# Patient Record
Sex: MALE | Race: WHITE | NOT HISPANIC OR LATINO | Employment: FULL TIME | URBAN - METROPOLITAN AREA
[De-identification: names, ages, dates, MRNs, and addresses within clinical notes are randomized per-mention and may not be internally consistent; named-entity substitution may affect disease eponyms.]

---

## 2017-08-01 ENCOUNTER — HOSPITAL ENCOUNTER (OUTPATIENT)
Dept: RADIOLOGY | Facility: HOSPITAL | Age: 32
Discharge: HOME | End: 2017-08-01
Attending: ORTHOPAEDIC SURGERY
Payer: COMMERCIAL

## 2017-08-01 DIAGNOSIS — M75.82 OTHER SHOULDER LESIONS, LEFT SHOULDER: ICD-10-CM

## 2017-08-01 PROCEDURE — A9585 GADOBUTROL INJECTION: HCPCS | Performed by: ORTHOPAEDIC SURGERY

## 2017-08-01 PROCEDURE — 23350 INJECTION FOR SHOULDER X-RAY: CPT | Mod: ,,, | Performed by: RADIOLOGY

## 2017-08-01 PROCEDURE — 25500020 PHARM REV CODE 255: Performed by: ORTHOPAEDIC SURGERY

## 2017-08-01 PROCEDURE — 23350 INJECTION FOR SHOULDER X-RAY: CPT | Mod: TC

## 2017-08-01 PROCEDURE — 73222 MRI JOINT UPR EXTREM W/DYE: CPT | Mod: 26,LT,, | Performed by: RADIOLOGY

## 2017-08-01 PROCEDURE — 73040 CONTRAST X-RAY OF SHOULDER: CPT | Mod: 26,,, | Performed by: RADIOLOGY

## 2017-08-01 PROCEDURE — 73222 MRI JOINT UPR EXTREM W/DYE: CPT | Mod: TC,LT

## 2017-08-01 RX ORDER — GADOBUTROL 604.72 MG/ML
1 INJECTION INTRAVENOUS
Status: COMPLETED | OUTPATIENT
Start: 2017-08-01 | End: 2017-08-01

## 2017-08-01 RX ADMIN — GADOBUTROL 1 ML: 604.72 INJECTION INTRAVENOUS at 11:08

## 2017-08-01 RX ADMIN — IOHEXOL 10 ML: 300 INJECTION, SOLUTION INTRAVENOUS at 11:08

## 2019-11-26 ENCOUNTER — HOSPITAL ENCOUNTER (OUTPATIENT)
Dept: RADIOLOGY | Facility: HOSPITAL | Age: 34
Discharge: HOME | End: 2019-11-26
Attending: PHYSICIAN ASSISTANT
Payer: COMMERCIAL

## 2019-11-26 DIAGNOSIS — M54.16 RADICULOPATHY OF LUMBAR REGION: ICD-10-CM

## 2019-11-26 PROCEDURE — 72148 MRI LUMBAR SPINE W/O DYE: CPT | Mod: TC

## 2019-11-26 PROCEDURE — 72148 MRI LUMBAR SPINE W/O DYE: CPT | Mod: 26,,, | Performed by: RADIOLOGY

## 2019-11-26 PROCEDURE — 72148 MRI LUMBAR SPINE WITHOUT CONTRAST: ICD-10-PCS | Mod: 26,,, | Performed by: RADIOLOGY

## 2019-12-04 ENCOUNTER — TELEPHONE (OUTPATIENT)
Dept: NEUROSURGERY | Facility: CLINIC | Age: 34
End: 2019-12-04

## 2019-12-04 NOTE — TELEPHONE ENCOUNTER
----- Message from Alvarez Simmons sent at 12/4/2019  2:07 PM CST -----  Contact: [address] / [phone]   Type:  Sooner Apoointment Request    Caller is requesting a sooner appointment.  Caller declined first available appointment listed below.  Caller will not accept being placed on the waitlist and is requesting a message be sent to doctor.  Name of Caller: Rayna   When is the first available appointment? 1/08/20  Symptoms: second opinion for back issues   Would the patient rather a call back or a response via [facility]?  Call back   Best Call Back Number: [phone]  Additional Information:

## 2019-12-11 NOTE — PROGRESS NOTES
History & Physical    SUBJECTIVE:     Chief Complaint:  Back pain and left leg pain    History of Present Illness:  Eliceo Rubio is 34 y.o.  male with history of hypothyroidism and seasonal allergies who presents for neurosurgical evaluation, referred by his PCP.  Patient is here with wife.  He reports of chronic intermittent low back pain for over 8 years.  Pain progressively worsened and may and he developed left leg radiating pain.  He went to physical therapy and chiropractor which only helped with his flexibility but now his pain.  Patient is complaining constant aching/burning/throbbing back pain rated 5-6/10 that radiates down left leg anteriorly to below the knee.  Leg pain is described as shocking/burning sensation.  He also occasionally hears popping in his back.  Back pain > leg pain.  He takes Percocet p.r.n. severe pain; he takes gabapentin 300 mg q.h.s. but makes him very groggy the next day.  Tried Flexeril and Zanaflex in the past with no relief.  Pain worsens with any back ROM, prolonged standing, or weight bear on his left leg.  Wife feels like he is progressively worsen as he is not able to do ADLs or care further children.  Denies any leg weakness, bladder/bowel incontinence, or saddle anesthesia.  Denies any previous epidural injections.          Low Back Pain Scale  R Low Back-Pain Score: 5  R Low Back-Pain Intensity: I can tolerate the pain I have without having to use pain killers  R Low Back-Pain Score: I can look after myself normally without causing extra pain  Low Back-Lifting: I can lift heavy weights but it gives extra pain   Low Back-Walking: Pain prevents me walking more than .5 mile   Low Back-Sitting: Pain prevents me from sitting more than 1 hour   Low Back-Standing: I cannot stand for longer than 10 minutes with increasing pain   Low Back-Sleeping: Because of pain my normal nights sleep is reduced by less than one half   Low Back-Social Life: Pain has no significant effect  on my social life apart from limiting my more en   Low Back-Traveling: I have extra pain while traveling but it does not compel me to seek alternate forms of travel   Low Back-Changing Degree of Pain: My pain is gradually worsening           Review of patient's allergies indicates:  No Known Allergies    No current outpatient medications on file.     No current facility-administered medications for this visit.        No past medical history on file.  No past surgical history on file.  Family History     None        Social History     Socioeconomic History    Marital status: Single     Spouse name: Not on file    Number of children: Not on file    Years of education: Not on file    Highest education level: Not on file   Occupational History    Not on file   Social Needs    Financial resource strain: Not on file    Food insecurity:     Worry: Not on file     Inability: Not on file    Transportation needs:     Medical: Not on file     Non-medical: Not on file   Tobacco Use    Smoking status: Not on file   Substance and Sexual Activity    Alcohol use: Not on file    Drug use: Not on file    Sexual activity: Not on file   Lifestyle    Physical activity:     Days per week: Not on file     Minutes per session: Not on file    Stress: Not on file   Relationships    Social connections:     Talks on phone: Not on file     Gets together: Not on file     Attends Evangelical service: Not on file     Active member of club or organization: Not on file     Attends meetings of clubs or organizations: Not on file     Relationship status: Not on file   Other Topics Concern    Not on file   Social History Narrative    Not on file       Review of Systems:  Review of Systems    Constitutional: no fever, chills or night sweats. No changes in weight   Eyes: no visual changes   ENT: no nasal congestion or sore throat   Respiratory: no cough or shortness of breath   Cardiovascular: no chest pain or palpitations  "  Gastrointestinal: no nausea or vomiting   Genitourinary: no hematuria or dysuria   Integument/Breast: no rash or pruritis   Hematologic/Lymphatic: no easy bruising or lymphadenopathy   Musculoskeletal: no arthralgias or myalgias.  Positive for low back pain radiating to left leg pain  Neurological: no seizures or tremors. No weakness or paresthesia.   Behavioral/Psych: no auditory or visual hallucinations   Endocrine: no heat or cold intolerance       OBJECTIVE:     Vital Signs  Temp: 97.8 °F (36.6 °C)  Pulse: (!) 56  BP: (!) 143/94  Pain Score:   6  Height: 5' 11" (180.3 cm)  Weight: 85 kg (187 lb 6.3 oz)  Body mass index is 26.14 kg/m².      Physical Exam:  Neurosurgery Physical Exam    General: well developed, well nourished, no distress.   Neurologic: Awake, alert and oriented x3. Thought content appropriate.  GCS: Motor: 6/Verbal: 5/Eyes: 4 GCS Total: 15  Cranial nerves: II-XII grossly intact. PERRLA. EOMI without nystagmus. Face symmetric and sensation intact to light touch, tongue midline, shoulder shrug symmetric bilaterally.  Hearing equal bilaterally to finger rub. Palate and uvula rise and fall normally in midline.    Language: no aphasia  Speech: no dysarthria   Neck: supple, without obvious masses    Sensory: intact to light touch B/L UE and LE  Motor Strength: Moves all extremities spontaneously with good tone. Full strength upper and lower extremities. No abnormal movements seen.    Strength  Deltoids Triceps Biceps Wrist Extension Wrist Flexion Hand    Upper: R 5/5 5/5 5/5 5/5 5/5 5/5    L 5/5 5/5 5/5 5/5 5/5 5/5     Iliopsoas Quadriceps Knee  Flexion Tibialis  anterior Gastro- cnemius EHL   Lower: R 5/5 5/5 5/5 5/5 5/5 5/5    L 5/5 5/5 5/5 5/5 5/5 5/5     Interossi muscle strength- intact    DTR's - 2 + and symmetric in UE and LE  Sy's - positive on left  Lhermitte's - Negative  Spurlings-   Negative         Ankle Clonus - Negative           Babinski  - Negative     SLR - Negative; " reproduces pain to low back   Gait - antalgic Tandem Gait - No difficulty    Able to walk/stand on heels & toes  Cervical ROM - full  Lumbar ROM - limited; pain with all ROM  No focal numbness or weakness  No midline or paraspinal tenderness to palpation  No difficulty transitioning from seated to standing position or vice versa.  ENT: normal hearing with finger rub  Heart: RRR, no cyanosis, pallor, or edema.   Lungs- normal respiratory effort  Abdomen-  soft, symmetric and nontender  Skin: grossly intact in all 4 extremities without obvious rashes or lesions  Extremities: warm with no cyanosis or edema, or clubbing  Pulses: palpable distal pulses    SI Joint tenderness - Negative  Greater Trochanter Joint tenderness - Negative  Prieto's Test - Negative   Pain on Hip ROM - Negative      Posture-  No obvious kyphosis with standing posture.  With bending posture, no obvious scapula wing        Diagnostic Results:  All imaging personally reviewed    MRI lumbar spine 11/26/2019  Straightening of lumbar lordosis.  L4-5 disc degeneration bulge with facet arthropathy causing mild left greater than right neural foraminal stenosis.  Severe degenerative disc disease at L5-S1 with mild disc herniation and moderate left neural foraminal stenosis.      ASSESSMENT/PLAN:     34-year-old male with history of asthma hypothyroidism and chronic back pain for over 8 years that has progressively worsened since May 2019 with radiation down to his left leg.    At this time, will start conservative treatment.  -continue home exercise therapy program.  Patient was given lumbar PT exercises.  -Robaxin t.i.d. p.r.n. muscle spasms.  -continue gabapentin and increased to 300 b.i.d. as tolerated.  -recommended alternating heat/ice with OTC TENS unit for myofascial pain  -refer patient to pain management for left L5-S1 transforaminal epidural steroid injection.  -patient will follow up me in 6 weeks pending completion of the above.        All  imaging were reviewed with patient and wife. All questions were answered.  Patient verbalized understanding and agreed with the above plan of care.  Patient was encouraged to call clinic with any future concerns prior to follow up appt.    Please call with any questions.      [facility]      Note dictated with voice recognition software, please excuse any grammatical errors.

## 2019-12-13 ENCOUNTER — TELEPHONE (OUTPATIENT)
Dept: PAIN MEDICINE | Facility: CLINIC | Age: 34
End: 2019-12-13

## 2019-12-13 ENCOUNTER — OFFICE VISIT (OUTPATIENT)
Dept: NEUROSURGERY | Facility: CLINIC | Age: 34
End: 2019-12-13
Payer: COMMERCIAL

## 2019-12-13 VITALS
TEMPERATURE: 98 F | HEIGHT: 71 IN | WEIGHT: 187.38 LBS | BODY MASS INDEX: 26.23 KG/M2 | HEART RATE: 56 BPM | SYSTOLIC BLOOD PRESSURE: 143 MMHG | DIASTOLIC BLOOD PRESSURE: 94 MMHG

## 2019-12-13 DIAGNOSIS — M54.16 LUMBAR RADICULOPATHY: Primary | ICD-10-CM

## 2019-12-13 DIAGNOSIS — M62.830 MUSCLE SPASM OF BACK: ICD-10-CM

## 2019-12-13 DIAGNOSIS — M54.16 LUMBAR RADICULOPATHY, ACUTE: Primary | ICD-10-CM

## 2019-12-13 DIAGNOSIS — M47.816 LUMBAR SPONDYLOSIS: ICD-10-CM

## 2019-12-13 DIAGNOSIS — M51.36 DEGENERATIVE DISC DISEASE, LUMBAR: ICD-10-CM

## 2019-12-13 PROCEDURE — 99999 PR PBB SHADOW E&M-EST. PATIENT-LVL IV: ICD-10-PCS | Mod: PBBFAC,,, | Performed by: PHYSICIAN ASSISTANT

## 2019-12-13 PROCEDURE — 99204 OFFICE O/P NEW MOD 45 MIN: CPT | Mod: S$GLB,,, | Performed by: PHYSICIAN ASSISTANT

## 2019-12-13 PROCEDURE — 99204 PR OFFICE/OUTPT VISIT, NEW, LEVL IV, 45-59 MIN: ICD-10-PCS | Mod: S$GLB,,, | Performed by: PHYSICIAN ASSISTANT

## 2019-12-13 PROCEDURE — 3008F PR BODY MASS INDEX (BMI) DOCUMENTED: ICD-10-PCS | Mod: CPTII,S$GLB,, | Performed by: PHYSICIAN ASSISTANT

## 2019-12-13 PROCEDURE — 3008F BODY MASS INDEX DOCD: CPT | Mod: CPTII,S$GLB,, | Performed by: PHYSICIAN ASSISTANT

## 2019-12-13 PROCEDURE — 99999 PR PBB SHADOW E&M-EST. PATIENT-LVL IV: CPT | Mod: PBBFAC,,, | Performed by: PHYSICIAN ASSISTANT

## 2019-12-13 RX ORDER — FLUTICASONE PROPIONATE 50 MCG
SPRAY, SUSPENSION (ML) NASAL
Refills: 2 | COMMUNITY
Start: 2019-11-29

## 2019-12-13 RX ORDER — GABAPENTIN 300 MG/1
CAPSULE ORAL
Refills: 0 | COMMUNITY
Start: 2019-10-15

## 2019-12-13 RX ORDER — LEVOTHYROXINE SODIUM 25 UG/1
50 TABLET ORAL DAILY
Refills: 1 | COMMUNITY
Start: 2019-10-16

## 2019-12-13 RX ORDER — METHOCARBAMOL 750 MG/1
750 TABLET, FILM COATED ORAL 3 TIMES DAILY PRN
Qty: 60 TABLET | Refills: 1 | Status: SHIPPED | OUTPATIENT
Start: 2019-12-13 | End: 2019-12-23

## 2019-12-13 RX ORDER — CETIRIZINE HYDROCHLORIDE 10 MG/1
10 TABLET ORAL DAILY
COMMUNITY

## 2019-12-13 RX ORDER — MELOXICAM 7.5 MG/1
7.5 TABLET ORAL DAILY
Qty: 14 TABLET | Refills: 1 | Status: SHIPPED | OUTPATIENT
Start: 2019-12-13 | End: 2019-12-27

## 2019-12-13 NOTE — LETTER
December 13, 2019      Jn Mendes PA-C  [address]  [place]  [place] [address]           [place] [facility]  [address]  [place] [address]  Phone: [phone]          Patient: Eliceo Rubio   MR Number: [id number]   YOB: 1985   Date of Visit: 12/13/2019       Dear Jn Mendes:    Thank you for referring Eliceo Rubio to me for evaluation. Attached you will find relevant portions of my assessment and plan of care.    If you have questions, please do not hesitate to call me. I look forward to following Eliceo Rubio along with you.    Sincerely,    Aneudy Pedraza PA-C    Enclosure  CC:  No Recipients    If you would like to receive this communication electronically, please contact [email or web address] or [phone] to request more information on [employer] Link access.    For providers and/or their staff who would like to refer a patient to [facility], please contact us through our one-stop-shop provider referral line, [facility] , at [phone].    If you feel you have received this communication in error or would no longer like to receive these types of communications, please e-mail [email or web address]

## 2019-12-13 NOTE — TELEPHONE ENCOUNTER
Pt scheduled for 12/31/19 at 2:45pm for Left TFESI. Pt aware to check in at registration desk on the first floor of the hospital for 1:45 pm. Pt denied taking blood thinners and is not diabetic.

## 2019-12-31 ENCOUNTER — HOSPITAL ENCOUNTER (OUTPATIENT)
Facility: HOSPITAL | Age: 34
Discharge: HOME | End: 2019-12-31
Attending: PAIN MEDICINE | Admitting: PAIN MEDICINE
Payer: COMMERCIAL

## 2019-12-31 VITALS
HEART RATE: 57 BPM | OXYGEN SATURATION: 98 % | TEMPERATURE: 98 F | SYSTOLIC BLOOD PRESSURE: 127 MMHG | WEIGHT: 185 LBS | DIASTOLIC BLOOD PRESSURE: 77 MMHG | HEIGHT: 71 IN | BODY MASS INDEX: 25.9 KG/M2 | RESPIRATION RATE: 16 BRPM

## 2019-12-31 DIAGNOSIS — G89.29 CHRONIC PAIN: ICD-10-CM

## 2019-12-31 DIAGNOSIS — M54.16 LUMBAR RADICULOPATHY: Primary | ICD-10-CM

## 2019-12-31 PROCEDURE — 25000003 PHARM REV CODE 250: Performed by: PAIN MEDICINE

## 2019-12-31 PROCEDURE — 25500020 PHARM REV CODE 255: Performed by: PAIN MEDICINE

## 2019-12-31 PROCEDURE — 99152 MOD SED SAME PHYS/QHP 5/>YRS: CPT | Performed by: PAIN MEDICINE

## 2019-12-31 PROCEDURE — 64483 NJX AA&/STRD TFRM EPI L/S 1: CPT | Performed by: PAIN MEDICINE

## 2019-12-31 PROCEDURE — 64484 NJX AA&/STRD TFRM EPI L/S EA: CPT | Mod: LT,,, | Performed by: PAIN MEDICINE

## 2019-12-31 PROCEDURE — 64484 PRA INJECT ANES/STEROID FORAMEN LUMBAR/SACRAL W IMG GUIDE ,EA ADD LEVEL: ICD-10-PCS | Mod: LT,,, | Performed by: PAIN MEDICINE

## 2019-12-31 PROCEDURE — 64484 NJX AA&/STRD TFRM EPI L/S EA: CPT | Performed by: PAIN MEDICINE

## 2019-12-31 PROCEDURE — 64483 NJX AA&/STRD TFRM EPI L/S 1: CPT | Mod: LT,,, | Performed by: PAIN MEDICINE

## 2019-12-31 PROCEDURE — 64483 PR EPIDURAL INJ, ANES/STEROID, TRANSFORAMINAL, LUMB/SACR, SNGL LEVL: ICD-10-PCS | Mod: LT,,, | Performed by: PAIN MEDICINE

## 2019-12-31 PROCEDURE — 99153 MOD SED SAME PHYS/QHP EA: CPT | Performed by: PAIN MEDICINE

## 2019-12-31 PROCEDURE — 63600175 PHARM REV CODE 636 W HCPCS: Performed by: PAIN MEDICINE

## 2019-12-31 RX ORDER — SODIUM CHLORIDE 9 MG/ML
500 INJECTION, SOLUTION INTRAVENOUS CONTINUOUS
Status: ACTIVE | OUTPATIENT
Start: 2019-12-31 | End: 2020-01-01

## 2019-12-31 RX ORDER — DEXAMETHASONE SODIUM PHOSPHATE 10 MG/ML
INJECTION INTRAMUSCULAR; INTRAVENOUS
Status: DISCONTINUED | OUTPATIENT
Start: 2019-12-31 | End: 2019-12-31 | Stop reason: HOSPADM

## 2019-12-31 RX ORDER — LIDOCAINE HYDROCHLORIDE 10 MG/ML
INJECTION, SOLUTION EPIDURAL; INFILTRATION; INTRACAUDAL; PERINEURAL
Status: DISCONTINUED | OUTPATIENT
Start: 2019-12-31 | End: 2019-12-31 | Stop reason: HOSPADM

## 2019-12-31 RX ORDER — INDOMETHACIN 25 MG/1
CAPSULE ORAL
Status: DISCONTINUED | OUTPATIENT
Start: 2019-12-31 | End: 2019-12-31 | Stop reason: HOSPADM

## 2019-12-31 RX ORDER — FENTANYL CITRATE 50 UG/ML
INJECTION, SOLUTION INTRAMUSCULAR; INTRAVENOUS
Status: DISCONTINUED | OUTPATIENT
Start: 2019-12-31 | End: 2019-12-31 | Stop reason: HOSPADM

## 2019-12-31 RX ORDER — MIDAZOLAM HYDROCHLORIDE 1 MG/ML
INJECTION, SOLUTION INTRAMUSCULAR; INTRAVENOUS
Status: DISCONTINUED | OUTPATIENT
Start: 2019-12-31 | End: 2019-12-31 | Stop reason: HOSPADM

## 2019-12-31 RX ORDER — BUPIVACAINE HYDROCHLORIDE 2.5 MG/ML
INJECTION, SOLUTION EPIDURAL; INFILTRATION; INTRACAUDAL
Status: DISCONTINUED | OUTPATIENT
Start: 2019-12-31 | End: 2019-12-31 | Stop reason: HOSPADM

## 2019-12-31 RX ORDER — LIDOCAINE HYDROCHLORIDE 10 MG/ML
1 INJECTION, SOLUTION EPIDURAL; INFILTRATION; INTRACAUDAL; PERINEURAL ONCE
Status: ACTIVE | OUTPATIENT
Start: 2019-12-31

## 2019-12-31 RX ADMIN — SODIUM CHLORIDE 500 ML: 0.9 INJECTION, SOLUTION INTRAVENOUS at 11:12

## 2019-12-31 NOTE — OP NOTE
"Procedure Note    Pre-operative Diagnosis: Lumbar Radiculopathy  Post-operative Diagnosis: Lumbar Radiculopathy  Procedure Date: 12/31/2019  Procedure:  (1) Lumbar Transforaminal Epidural Steroid Injection, Two Levels, LEFT L4-5 and L5-S1    (2) Intraoperative fluoroscopy          Indications: To alleviate pain and suffering, and reduce functional impairment associated with Lumbar radiculopathy.      The patients history and physical exam were reviewed. The risks, benefits and alternatives to the procedure were discussed, and all questions were answered to the patients satisfaction. The patient agreed to proceed, and written informed consent was verified.    Procedure in Detail: Using a fenestrated drape and Chloro-prep, the skin was prepared and draped in sterile fashion. The Left L4-5 and L5-S1 neural foramena were identified by fluoroscopy by Left lateral oblique angle. A mixture of Lidocaine 1% 4 mL + Sodium Bicarbonate 1 mL was used to anesthetize the skin at the skin entry point and subcutaneous tissue with 25G 1.5" in needle. Then a 22G 5" spinal needle was advanced under intermittent fluoroscopy toward each target point until Kambin's triangle was entered anterolateral to the superior articular process. Fluoroscopy was then inspected from lateral and AP view and needle adjusted appropriately. There was no paresthesia with needle placement. Aspiration was negative for blood and CSF. Omnipaque contrast was injected live in an AP fluoroscopic view at each level demonstrating appropriate needle position with contrast spread into the nerve root sheath and medially into the epidural space without intravascular or intrathecal spread. Next, a mixture 1.5 mL PF Bupivacaine 0.25% and 15 mg dexamethasone (total 3 mL) was divided evenly between the two levels and injected slowly and incrementally. The patient tolerated the procedure without complaint and was transported to the recovery room in stable condition. "     Disposition: The patient tolerated the procedure well, and there were no apparent complications. Vital signs remained stable throughout the procedure. The patient was taken to the recovery area where written discharge instructions for the procedure were given.     Follow-up: RTC as scheduled      Silver Perez Jr, MD  Interventional Pain Medicine / Anesthesiology

## 2019-12-31 NOTE — H&P
[facility]  History & Physical - Short Stay  Pain Management           SUBJECTIVE:     Procedure: Procedure(s) (LRB):  Injection,steroid,epidural,transforaminal approach--Left L4-5, L5-S1 (Left)    Chief Complaint/Reason for Admission:  Lumbar radiculopathy [M54.16]    PTA Medications   Medication Sig    cetirizine (ZYRTEC) 10 MG tablet Take 10 mg by mouth once daily.    fluticasone propionate (FLONASE) 50 mcg/actuation nasal spray     SYNTHROID 25 mcg tablet Take 25 mcg by mouth once daily.    gabapentin (NEURONTIN) 300 MG capsule TAKE 1 CAPSULE BY MOUTH EVERY DAY AT BEDTIME FOR 30 DAYS.       Review of patient's allergies indicates:   Allergen Reactions    Morpholine analogues Anaphylaxis and Hives       Past Medical History:   Diagnosis Date    Asthma     childhood    Thyroid nodule      Past Surgical History:   Procedure Laterality Date    lipoma  Right     Right clavicle     NOSE SURGERY      TONSILLECTOMY      UMBILICAL HERNIA REPAIR       History reviewed. No pertinent family history.  Social History     Tobacco Use    Smoking status: Never Smoker   Substance Use Topics    Alcohol use: Yes     Comment: weekend    Drug use: Not on file        Review of Systems:  Review of Systems   Constitutional: Negative for chills and fever.   HENT: Negative for nosebleeds.    Eyes: Negative for blurred vision and pain.   Respiratory: Negative for hemoptysis.    Cardiovascular: Negative for chest pain and palpitations.   Gastrointestinal: Negative for heartburn, nausea and vomiting.   Genitourinary: Negative for dysuria and hematuria.   Musculoskeletal: Positive for back pain. Negative for myalgias.   Skin: Negative for rash.   Neurological: Positive for tingling and sensory change. Negative for seizures and loss of consciousness.   Endo/Heme/Allergies: Does not bruise/bleed easily.   Psychiatric/Behavioral: Negative for hallucinations.         OBJECTIVE:     Vital Signs (Most  Recent):  Temp: 98 °F (36.7 °C) (12/31/19 1105)  Pulse: (!) 50 (12/31/19 1105)  Resp: 16 (12/31/19 1105)  BP: 139/83 (12/31/19 1105)    Physical Exam:  General appearance - alert, well appearing, and in no distress  Mental status - AOx3  Eyes - pupils equal and reactive, extraocular eye movements intact  Heart - normal rate, regular rhythm, normal S1, S2, no murmurs, rubs, clicks or gallops  Chest - clear to auscultation, no wheezes, rales or rhonchi, symmetric air entry  Abdomen - soft, nontender, nondistended, no masses or organomegaly  Neurological - alert, oriented, normal speech, no focal findings or movement disorder noted  Extremities - peripheral pulses normal, no pedal edema, no clubbing or cyanosis  Back - + SLR on left    ASSESSMENT/PLAN:     Active Hospital Problems    Diagnosis  POA    Chronic pain [G89.29]  Yes      Resolved Hospital Problems   No resolved problems to display.        The risks and benefits of this intervention, and alternative therapies were discussed with the patient.  The discussion of risks included infection, bleeding, need for additional procedures or surgery, nerve damage, paralysis, adverse medication reaction(s), stroke, and/or death.  Questions regarding the procedure, risks, expected outcome, and possible side effects were solicited and answered to the patient's satisfaction.  Eliceo wishes to proceed with the injection.  Verbal and written consent were verified.      Proceed with intervention as scheduled.      Silver Perez Jr, MD  Interventional Pain Medicine / Anesthesiology

## 2019-12-31 NOTE — PROGRESS NOTES
Reviewed discharge instructions. Voiced good teachback of instructions. Stable , no distress. To car per staff via w/c to family

## 2019-12-31 NOTE — DISCHARGE SUMMARY
[facility]  Discharge Note  Short Stay     Admit Date: 12/31/2019    Discharge Date: 12/31/2019     Attending Physician: Silver Perez Jr, MD    Diagnoses:  Active Hospital Problems    Diagnosis  POA    Chronic pain [G89.29]  Yes      Resolved Hospital Problems   No resolved problems to display.     Discharged Condition: Good     Hospital Course: Patient was admitted for an outpatient interventional pain management procedure and tolerated the procedure well with no complications.     Final Diagnoses: Same as principal problem.     Disposition: Home or Self Care     Follow up/Patient Instructions:    Follow-up Information     Aneudy Pedraza PA-C. Go in 3 weeks.    Specialty:  Neurosurgery  Why:  Post-procedural Follow Up As Scheduled  Contact information:  [address]  [address]  [facility] [address] [phone]                   Reconciled Medications:     Medication List      CONTINUE taking these medications    cetirizine 10 MG tablet  Commonly known as:  ZYRTEC  Take 10 mg by mouth once daily.     fluticasone propionate 50 mcg/actuation nasal spray  Commonly known as:  FLONASE     gabapentin 300 MG capsule  Commonly known as:  NEURONTIN  TAKE 1 CAPSULE BY MOUTH EVERY DAY AT BEDTIME FOR 30 DAYS.     Synthroid 25 MCG tablet  Generic drug:  levothyroxine  Take 25 mcg by mouth once daily.           Discharge Procedure Orders (must include Diet, Follow-up, Activity)   Call MD for:  temperature >100.4     Call MD for:  severe uncontrolled pain     Call MD for:  redness, tenderness, or signs of infection (pain, swelling, redness, odor or green/yellow discharge around incision site)     Call MD for:  difficulty breathing or increased cough     Call MD for:  severe persistent headache     Call MD for:  worsening rash     Remove dressing in 24 hours       Silver Perez Jr, MD  Interventional Pain Medicine / Anesthesiology

## 2020-01-22 ENCOUNTER — HOSPITAL ENCOUNTER (OUTPATIENT)
Dept: RADIOLOGY | Facility: HOSPITAL | Age: 35
Discharge: HOME | End: 2020-01-22
Attending: PHYSICIAN ASSISTANT
Payer: COMMERCIAL

## 2020-01-22 ENCOUNTER — OFFICE VISIT (OUTPATIENT)
Dept: NEUROSURGERY | Facility: CLINIC | Age: 35
End: 2020-01-22
Payer: COMMERCIAL

## 2020-01-22 VITALS
HEART RATE: 76 BPM | DIASTOLIC BLOOD PRESSURE: 84 MMHG | SYSTOLIC BLOOD PRESSURE: 143 MMHG | TEMPERATURE: 98 F | BODY MASS INDEX: 25.9 KG/M2 | WEIGHT: 185 LBS | HEIGHT: 71 IN

## 2020-01-22 DIAGNOSIS — M54.16 LEFT LUMBAR RADICULOPATHY: ICD-10-CM

## 2020-01-22 DIAGNOSIS — M51.36 DDD (DEGENERATIVE DISC DISEASE), LUMBAR: ICD-10-CM

## 2020-01-22 DIAGNOSIS — M43.16 SPONDYLOLISTHESIS OF LUMBAR REGION: ICD-10-CM

## 2020-01-22 DIAGNOSIS — M47.816 LUMBAR SPONDYLOSIS: Primary | ICD-10-CM

## 2020-01-22 DIAGNOSIS — M47.816 LUMBAR SPONDYLOSIS: ICD-10-CM

## 2020-01-22 PROCEDURE — 99999 PR PBB SHADOW E&M-EST. PATIENT-LVL III: CPT | Mod: PBBFAC,,, | Performed by: PHYSICIAN ASSISTANT

## 2020-01-22 PROCEDURE — 99214 PR OFFICE/OUTPT VISIT, EST, LEVL IV, 30-39 MIN: ICD-10-PCS | Mod: S$GLB,,, | Performed by: PHYSICIAN ASSISTANT

## 2020-01-22 PROCEDURE — 99999 PR PBB SHADOW E&M-EST. PATIENT-LVL III: ICD-10-PCS | Mod: PBBFAC,,, | Performed by: PHYSICIAN ASSISTANT

## 2020-01-22 PROCEDURE — 72100 X-RAY EXAM L-S SPINE 2/3 VWS: CPT | Mod: TC,PN

## 2020-01-22 PROCEDURE — 3008F BODY MASS INDEX DOCD: CPT | Mod: CPTII,S$GLB,, | Performed by: PHYSICIAN ASSISTANT

## 2020-01-22 PROCEDURE — 72120 XR LUMBAR SPINE AP AND LAT WITH FLEX/EXT: ICD-10-PCS | Mod: 26,,, | Performed by: RADIOLOGY

## 2020-01-22 PROCEDURE — 72100 X-RAY EXAM L-S SPINE 2/3 VWS: CPT | Mod: 26,,, | Performed by: RADIOLOGY

## 2020-01-22 PROCEDURE — 72120 X-RAY BEND ONLY L-S SPINE: CPT | Mod: 26,,, | Performed by: RADIOLOGY

## 2020-01-22 PROCEDURE — 3008F PR BODY MASS INDEX (BMI) DOCUMENTED: ICD-10-PCS | Mod: CPTII,S$GLB,, | Performed by: PHYSICIAN ASSISTANT

## 2020-01-22 PROCEDURE — 99214 OFFICE O/P EST MOD 30 MIN: CPT | Mod: S$GLB,,, | Performed by: PHYSICIAN ASSISTANT

## 2020-01-22 PROCEDURE — 72100 XR LUMBAR SPINE AP AND LAT WITH FLEX/EXT: ICD-10-PCS | Mod: 26,,, | Performed by: RADIOLOGY

## 2020-01-22 NOTE — PROGRESS NOTES
Established Patient    SUBJECTIVE:     Note dictated with voice recognition software, please excuse any grammatical errors.    History of Present Illness:  Eliceo Rubio is a 34 y.o. male who presents for neurosurgical re-evaluation after lumbar transforaminal epidural injection.  Patient underwent left L4-5 and L5-S1 transfemoral epidural injection with Dr. Perez on 12/31/2019.  Patient is here with wife. He reports that the injection was very pain but did improved pain intensity for a few days. However, during the first few days of injection, he had difficulty sleeping. Now, he continues to complain of left low back radiating to left hip with intermittent radiation to left thigh > left foot. Rarely, he has numbness in his left foot. Now, he also notices popping sounds in his back with certain movements. Pain worsens with prolonged sitting, standing, or back bending motions. Denies leg weakness or b/b incontinence. Not taking any gabapentin d/t side effects.        Interval History  12/13/2019  Eliceo Rubio is 34 y.o.  male with history of hypothyroidism and seasonal allergies who presents for neurosurgical evaluation, referred by his PCP.  Patient is here with wife.  He reports of chronic intermittent low back pain for over 8 years.  Pain progressively worsened and may and he developed left leg radiating pain.  He went to physical therapy and chiropractor which only helped with his flexibility but now his pain.  Patient is complaining constant aching/burning/throbbing back pain rated 5-6/10 that radiates down left leg anteriorly to below the knee.  Leg pain is described as shocking/burning sensation.  He also occasionally hears popping in his back.  Back pain > leg pain.  He takes Percocet p.r.n. severe pain; he takes gabapentin 300 mg q.h.s. but makes him very groggy the next day.  Tried Flexeril and Zanaflex in the past with no relief.  Pain worsens with any back ROM, prolonged standing, or  weight bear on his left leg.  Wife feels like he is progressively worsen as he is not able to do ADLs or care further children.  Denies any leg weakness, bladder/bowel incontinence, or saddle anesthesia.  Denies any previous epidural injections.       Low Back Pain Scale  R Low Back-Pain Score: 8  R Low Back-Pain Intensity: Pain killers have no effect on the pain and I do not use them  R Low Back-Pain Score: I can look after myself normally without causing extra pain  Low Back-Lifting: Pain prevents me from lifting heavy weights off the floor, but I can manage if they are conveniently positioned for example on a table   Low Back-Walking: Pain prevents me walking more than 1 mile   Low Back-Sitting: Pain prevents me from sitting more than 1 hour   Low Back-Standing: I cannot stand for longer than 1 hour without increasing pain   Low Back-Sleeping: Because of pain my normal nights sleep is reduced by less than one quarter   Low Back-Social Life: Pain has no significant effect on my social life apart from limiting my more en   Low Back-Traveling: I have some pain when traveling but frances of my usual forms of travel make it any worse   Low Back-Changing Degree of Pain: My pain seems to be getting better but improvement is slow             Review of patient's allergies indicates:   Allergen Reactions    Morpholine analogues Anaphylaxis and Hives       Current Outpatient Medications   Medication Sig Dispense Refill    cetirizine (ZYRTEC) 10 MG tablet Take 10 mg by mouth once daily.      gabapentin (NEURONTIN) 300 MG capsule TAKE 1 CAPSULE BY MOUTH EVERY DAY AT BEDTIME FOR 30 DAYS.  0    SYNTHROID 25 mcg tablet Take 25 mcg by mouth once daily.  1    fluticasone propionate (FLONASE) 50 mcg/actuation nasal spray   2     No current facility-administered medications for this visit.      Facility-Administered Medications Ordered in Other Visits   Medication Dose Route Frequency Provider Last Rate Last Dose    lidocaine (PF)  10 mg/ml (1%) injection 10 mg  1 mL Intradermal Once Silver Perez Jr., MD           Past Medical History:   Diagnosis Date    Asthma     childhood    Thyroid nodule      Past Surgical History:   Procedure Laterality Date    lipoma  Right     Right clavicle     NOSE SURGERY      TONSILLECTOMY      TRANSFORAMINAL EPIDURAL INJECTION OF STEROID Left 12/31/2019    Procedure: Injection,steroid,epidural,transforaminal approach--Left L4-5, L5-S1;  Surgeon: Silver Perez Jr., MD;  Location: [facility];  Service: Pain Management;  Laterality: Left;    UMBILICAL HERNIA REPAIR       Family History     None        Social History     Socioeconomic History    Marital status: Single     Spouse name: Not on file    Number of children: Not on file    Years of education: Not on file    Highest education level: Not on file   Occupational History    Not on file   Social Needs    Financial resource strain: Not on file    Food insecurity:     Worry: Not on file     Inability: Not on file    Transportation needs:     Medical: Not on file     Non-medical: Not on file   Tobacco Use    Smoking status: Never Smoker   Substance and Sexual Activity    Alcohol use: Yes     Comment: weekend    Drug use: Not on file    Sexual activity: Not on file   Lifestyle    Physical activity:     Days per week: Not on file     Minutes per session: Not on file    Stress: Not on file   Relationships    Social connections:     Talks on phone: Not on file     Gets together: Not on file     Attends Episcopal service: Not on file     Active member of club or organization: Not on file     Attends meetings of clubs or organizations: Not on file     Relationship status: Not on file   Other Topics Concern    Not on file   Social History Narrative    Not on file       Review of Systems:  Review of Systems    Constitutional: no fever, chills or night sweats. No changes in weight   Eyes: no visual changes   ENT: no nasal congestion or  "sore throat   Respiratory: no cough or shortness of breath   Cardiovascular: no chest pain or palpitations   Gastrointestinal: no nausea or vomiting   Genitourinary: no hematuria or dysuria   Integument/Breast: no rash or pruritis   Hematologic/Lymphatic: no easy bruising or lymphadenopathy   Musculoskeletal: no arthralgias or myalgias.  Positive for low back pain radiating to left leg pain  Neurological: no seizures or tremors. No weakness or paresthesia.   Behavioral/Psych: no auditory or visual hallucinations   Endocrine: no heat or cold intolerance     OBJECTIVE:     Vital Signs  Temp: 98.3 °F (36.8 °C)  Pulse: 76  BP: (!) 143/84  Pain Score:   7  Height: 5' 11" (180.3 cm)  Weight: 83.9 kg (185 lb)  Body mass index is 25.8 kg/m².    Physical Exam:  Neurosurgery Physical Exam    General: well developed, well nourished, no distress.   Neurologic: Awake, alert and oriented x3. Thought content appropriate.  GCS: Motor: 6/Verbal: 5/Eyes: 4 GCS Total: 15  Cranial nerves: II-XII grossly intact. PERRLA. EOMI without nystagmus. Face symmetric and sensation intact to light touch, tongue midline, shoulder shrug symmetric bilaterally.  Hearing equal bilaterally to finger rub. Palate and uvula rise and fall normally in midline.     Language: no aphasia  Speech: no dysarthria   Neck: supple, without obvious masses     Sensory: intact to light touch B/L UE and LE  Motor Strength: Moves all extremities spontaneously with good tone. Full strength upper and lower extremities. No abnormal movements seen.    Strength   Deltoids Triceps Biceps Wrist Extension Wrist Flexion Hand    Upper: R 5/5 5/5 5/5 5/5 5/5 5/5     L 5/5 5/5 5/5 5/5 5/5 5/5       Iliopsoas Quadriceps Knee  Flexion Tibialis  anterior Gastro- cnemius EHL   Lower: R 5/5 5/5 5/5 5/5 5/5 5/5     L 5/5 5/5 5/5 5/5 5/5 5/5      Interossi muscle strength- intact     DTR's - 2 + and symmetric in UE and LE  Sy's - positive on left  Lhermitte's - " Negative  Spurlings-   Negative         Ankle Clonus - Negative           Babinski  - Negative     SLR - Negative; reproduces pain to low back              Gait - antalgic Tandem Gait - No difficulty        Able to walk/stand on heels & toes  Cervical ROM - full                  Lumbar ROM - limited; pain with all ROM  No focal numbness or weakness  No midline or paraspinal tenderness to palpation  No difficulty transitioning from seated to standing position or vice versa.  ENT: normal hearing with finger rub  Heart: RRR, no cyanosis, pallor, or edema.   Lungs- normal respiratory effort  Abdomen-  soft, symmetric and nontender  Skin: grossly intact in all 4 extremities without obvious rashes or lesions  Extremities: warm with no cyanosis or edema, or clubbing  Pulses: palpable distal pulses     SI Joint tenderness - Negative  Greater Trochanter Joint tenderness - Negative  Prieto's Test - Negative           Pain on Hip ROM - Negative        Posture-  No obvious kyphosis with standing posture.  With bending posture, no obvious scapula wing           Diagnostic Results:  All imaging personally reviewed    MRI lumbar spine 11/26/2019  Straightening of lumbar lordosis.  L4-5 disc degeneration bulge with facet arthropathy causing mild left greater than right neural foraminal stenosis.  Severe degenerative disc disease at L5-S1 with mild disc herniation and moderate left neural foraminal stenosis. Mild retrolisthesis of L5-S1    ASSESSMENT/PLAN:     34-year-old male with history of asthma, hypothyroidism, and chronic back pain for over 8 years that has progressively worsened since May 2019 with intermittent radiation down to his left leg.    -failed conservative treatment with PO meds, therapy, chiropractic treatment, and lumbar epidural injections.   -obtain flex/ex lumbar to r/o instability.   -Order LSO brace prn  -Patient will follow up with Dr. Aguillon in 4-6 weeks to discuss lumbar fusion vs. Laminectomy.   -in the  interim, continue PO anti-inflammatory and robaxin.       All imaging were reviewed with patient and wife. All questions were answered.  Patient verbalized understanding and agreed with the above plan of care.  Patient was encouraged to call clinic with any future concerns prior to follow up appt.     Please call with any questions.      [facility]          Note dictated with voice recognition software, please excuse any grammatical errors.

## 2020-03-09 ENCOUNTER — HOSPITAL ENCOUNTER (OUTPATIENT)
Dept: RADIOLOGY | Facility: HOSPITAL | Age: 35
Discharge: HOME | End: 2020-03-09
Attending: NEUROLOGICAL SURGERY
Payer: COMMERCIAL

## 2020-03-09 ENCOUNTER — OFFICE VISIT (OUTPATIENT)
Dept: NEUROSURGERY | Facility: CLINIC | Age: 35
End: 2020-03-09
Payer: COMMERCIAL

## 2020-03-09 VITALS — HEART RATE: 67 BPM | SYSTOLIC BLOOD PRESSURE: 132 MMHG | DIASTOLIC BLOOD PRESSURE: 86 MMHG

## 2020-03-09 DIAGNOSIS — M54.17 LUMBOSACRAL RADICULOPATHY AT L5: ICD-10-CM

## 2020-03-09 DIAGNOSIS — M51.37 DDD (DEGENERATIVE DISC DISEASE), LUMBOSACRAL: Primary | ICD-10-CM

## 2020-03-09 DIAGNOSIS — M51.37 DDD (DEGENERATIVE DISC DISEASE), LUMBOSACRAL: ICD-10-CM

## 2020-03-09 DIAGNOSIS — M43.10 RETROLISTHESIS OF VERTEBRAE: ICD-10-CM

## 2020-03-09 DIAGNOSIS — M48.07 FORAMINAL STENOSIS OF LUMBOSACRAL REGION: ICD-10-CM

## 2020-03-09 PROCEDURE — 99213 OFFICE O/P EST LOW 20 MIN: CPT | Mod: S$GLB,,, | Performed by: NEUROLOGICAL SURGERY

## 2020-03-09 PROCEDURE — 72082 X-RAY EXAM ENTIRE SPI 2/3 VW: CPT | Mod: TC,PN

## 2020-03-09 PROCEDURE — 72082 X-RAY EXAM ENTIRE SPI 2/3 VW: CPT | Mod: 26,,, | Performed by: RADIOLOGY

## 2020-03-09 PROCEDURE — 72082 XR SCOLIOSIS COMPLETE: ICD-10-PCS | Mod: 26,,, | Performed by: RADIOLOGY

## 2020-03-09 PROCEDURE — 99999 PR PBB SHADOW E&M-EST. PATIENT-LVL III: CPT | Mod: PBBFAC,,, | Performed by: NEUROLOGICAL SURGERY

## 2020-03-09 PROCEDURE — 99213 PR OFFICE/OUTPT VISIT, EST, LEVL III, 20-29 MIN: ICD-10-PCS | Mod: S$GLB,,, | Performed by: NEUROLOGICAL SURGERY

## 2020-03-09 PROCEDURE — 99999 PR PBB SHADOW E&M-EST. PATIENT-LVL III: ICD-10-PCS | Mod: PBBFAC,,, | Performed by: NEUROLOGICAL SURGERY

## 2020-03-09 RX ORDER — METHOCARBAMOL 750 MG/1
500 TABLET, FILM COATED ORAL 4 TIMES DAILY
Status: ON HOLD | COMMUNITY
End: 2020-07-18 | Stop reason: HOSPADM

## 2020-03-09 RX ORDER — TRAMADOL HYDROCHLORIDE 50 MG/1
50 TABLET ORAL EVERY 6 HOURS
Qty: 60 TABLET | Refills: 0 | Status: ON HOLD | OUTPATIENT
Start: 2020-03-09 | End: 2020-07-18 | Stop reason: HOSPADM

## 2020-03-09 NOTE — PROGRESS NOTES
NEUROSURGICAL OUTPATIENT CONSULTATION NOTE    DATE OF SERVICE:  03/09/2020    ATTENDING PHYSICIAN:  Olayinka Aguillon MD    CONSULT REQUESTED BY:  Aneudy PAREKH    REASON FOR CONSULT:  Low back pain, left leg pain    SUBJECTIVE:    HISTORY OF PRESENT ILLNESS:  This is a very pleasant 34 y.o. male who has been complaining from worsening low back pain in the lumbosacral area radiating down the posterior leg on the left side in the L5 distribution.  The pain has been worsening for more than 5 years.  The leg pain is triggered by sneezing, coughing, bending forward.  Over the last year the pain has been affecting his functional status and quality of life.  The pain is usually triggered by physical activities such as gardening, playing with his kids, playing basketball.  When the pain gets worse the patient is unable to walk more than 1 mi.  He has tried physical therapy for more than 6 weeks without significant pain relief.  He has had spinal injection with only short-term pain relief.    Low Back Pain Scale  R Low Back-Pain Score: 7  R Low Back-Pain Intensity: Pain killers give very little relief from pain  Personal Care : I can look after myself normally without causing extra pain.  Lifting: Pain prevents me from lifting heavy weights off the floor, but I can manage if they are conveniently positioned. (i.e. on a table)  Walking: Pain prevents me walking more than 1/2 mile.  Sitting: Pain prevents me from sitting more than one hour.   Low Back-Standing: I cannot stand for longer than 10 minutes with increasing pain   Low Back-Sleeping: Because of pain my normal nights sleep is reduced by less than one quarter  Social Life: Pain has restricted my social life, and I do not go out as often.   Low Back-Traveling: I have extra pain while traveling but it does not compel me to seek alternate forms of travel   Low Back-Changing Degree of Pain: My pain is gradually worsening         PAST MEDICAL HISTORY:  Active Ambulatory  Problems     Diagnosis Date Noted    Chronic pain 12/31/2019     Resolved Ambulatory Problems     Diagnosis Date Noted    No Resolved Ambulatory Problems     Past Medical History:   Diagnosis Date    Asthma     Thyroid nodule        PAST SURGICAL HISTORY:  Past Surgical History:   Procedure Laterality Date    lipoma  Right     Right clavicle     NOSE SURGERY      TONSILLECTOMY      TRANSFORAMINAL EPIDURAL INJECTION OF STEROID Left 12/31/2019    Procedure: Injection,steroid,epidural,transforaminal approach--Left L4-5, L5-S1;  Surgeon: Silver Perez Jr., MD;  Location: [facility];  Service: Pain Management;  Laterality: Left;    UMBILICAL HERNIA REPAIR         SOCIAL HISTORY:   Social History     Socioeconomic History    Marital status: Single     Spouse name: Not on file    Number of children: Not on file    Years of education: Not on file    Highest education level: Not on file   Occupational History    Not on file   Social Needs    Financial resource strain: Not on file    Food insecurity:     Worry: Not on file     Inability: Not on file    Transportation needs:     Medical: Not on file     Non-medical: Not on file   Tobacco Use    Smoking status: Never Smoker   Substance and Sexual Activity    Alcohol use: Yes     Comment: weekend    Drug use: Not on file    Sexual activity: Not on file   Lifestyle    Physical activity:     Days per week: Not on file     Minutes per session: Not on file    Stress: Not on file   Relationships    Social connections:     Talks on phone: Not on file     Gets together: Not on file     Attends Scientologist service: Not on file     Active member of club or organization: Not on file     Attends meetings of clubs or organizations: Not on file     Relationship status: Not on file   Other Topics Concern    Not on file   Social History Narrative    Not on file       FAMILY HISTORY:  No family history on file.    CURRENTS MEDICATIONS:  Current Outpatient  Medications on File Prior to Visit   Medication Sig Dispense Refill    cetirizine (ZYRTEC) 10 MG tablet Take 10 mg by mouth once daily.      fluticasone propionate (FLONASE) 50 mcg/actuation nasal spray   2    gabapentin (NEURONTIN) 300 MG capsule TAKE 1 CAPSULE BY MOUTH EVERY DAY AT BEDTIME FOR 30 DAYS.  0    methocarbamol (ROBAXIN) 750 MG Tab Take 500 mg by mouth 4 (four) times daily.      SYNTHROID 25 mcg tablet Take 25 mcg by mouth once daily.  1     Current Facility-Administered Medications on File Prior to Visit   Medication Dose Route Frequency Provider Last Rate Last Dose    lidocaine (PF) 10 mg/ml (1%) injection 10 mg  1 mL Intradermal Once Silver Perez Jr., MD           ALLERGIES:  Review of patient's allergies indicates:   Allergen Reactions    Morpholine analogues Anaphylaxis and Hives       REVIEW OF SYSTEMS:  Review of Systems   Constitutional: Negative for diaphoresis, fever and weight loss.   Respiratory: Negative for shortness of breath.    Cardiovascular: Negative for chest pain.   Gastrointestinal: Negative for blood in stool.   Genitourinary: Negative for hematuria.   Endo/Heme/Allergies: Does not bruise/bleed easily.   All other systems reviewed and are negative.      OBJECTIVE:    PHYSICAL EXAMINATION:   Vitals:    03/09/20 0847   BP: 132/86   Pulse: 67       Physical Exam:  Vitals reviewed.    Constitutional: He appears well-developed and well-nourished.     Eyes: Pupils are equal, round, and reactive to light. Conjunctivae and EOM are normal.     Cardiovascular: Normal distal pulses and no edema.     Abdominal: Soft.     Skin: Skin displays no rash on trunk and no rash on extremities. Skin displays no lesions on trunk and no lesions on extremities.     Psych/Behavior: He is alert. He is oriented to person, place, and time. He has a normal mood and affect.     Musculoskeletal:        Neck: Range of motion is full.     Neurological:        DTRs: Tricep reflexes are 2+ on the right  side and 2+ on the left side. Bicep reflexes are 2+ on the right side and 2+ on the left side. Brachioradialis reflexes are 2+ on the right side and 2+ on the left side. Patellar reflexes are 2+ on the right side and 2+ on the left side. Achilles reflexes are 2+ on the right side and 2+ on the left side.       Back Exam     Tenderness   The patient is experiencing tenderness in the lumbar.    Range of Motion   Extension: abnormal   Flexion: abnormal   Lateral bend right: abnormal   Lateral bend left: abnormal   Rotation right: abnormal   Rotation left: abnormal     Muscle Strength   Right Quadriceps:  5/5   Left Quadriceps:  5/5   Right Hamstrings:  5/5   Left Hamstrings:  5/5     Tests   Straight leg raise right: negative  Straight leg raise left: negative    Other   Toe walk: normal  Heel walk: normal              Neurologic Exam     Mental Status   Oriented to person, place, and time.   Speech: speech is normal   Level of consciousness: alert    Cranial Nerves   Cranial nerves II through XII intact.     CN III, IV, VI   Pupils are equal, round, and reactive to light.  Extraocular motions are normal.     Motor Exam   Muscle bulk: normal  Overall muscle tone: normal    Strength   Right deltoid: 5/5  Left deltoid: 5/5  Right biceps: 5/5  Left biceps: 5/5  Right triceps: 5/5  Left triceps: 5/5  Right wrist flexion: 5/5  Left wrist flexion: 5/5  Right wrist extension: 5/5  Left wrist extension: 5/5  Right interossei: 5/5  Left interossei: 5/5  Right iliopsoas: 5/5  Left iliopsoas: 5/5  Right quadriceps: 5/5  Left quadriceps: 5/5  Right hamstrin/5  Left hamstrin/5  Right anterior tibial: 5/5  Left anterior tibial: 5/5  Right posterior tibial: 5/5  Left posterior tibial: 5/5  Right peroneal: 5/5  Left peroneal: 5/5  Right gastroc: 5/5  Left gastroc: 5/5    Sensory Exam   Light touch normal.   Pinprick normal.     Gait, Coordination, and Reflexes     Gait  Gait: normal    Coordination   Finger to nose  coordination: normal  Tandem walking coordination: normal    Reflexes   Right brachioradialis: 2+  Left brachioradialis: 2+  Right biceps: 2+  Left biceps: 2+  Right triceps: 2+  Left triceps: 2+  Right patellar: 2+  Left patellar: 2+  Right achilles: 2+  Left achilles: 2+  Right plantar: normal  Left plantar: normal  Right Sy: absent  Left Sy: absent  Right ankle clonus: absent  Left ankle clonus: absent        DIAGNOSTIC DATA:  I personally interpreted the following imaging:   Lumbar spine MRI November 2019 showing degenerative disc disease at L5-S1 with retrolisthesis of L5 over S1 and left moderate foraminal stenosis  Scoliosis film shows global lumbar lordosis at 15°, pelvic incidence at 45°, no sagittal plane imbalance    ASSESMENT:  This is a 34 y.o. male with     Problem List Items Addressed This Visit     None      Visit Diagnoses     DDD (degenerative disc disease), lumbosacral    -  Primary    Relevant Orders    X-Ray Scoliosis Complete    Ambulatory referral/consult to Urology    Retrolisthesis of vertebrae        Relevant Orders    Ambulatory referral/consult to Urology    Foraminal stenosis of lumbosacral region        Relevant Orders    Ambulatory referral/consult to Urology    Lumbosacral radiculopathy at L5        Relevant Orders    Ambulatory referral/consult to Urology          PLAN:  I explained the natural history of the disease and all treatment options. I recommended a left L5-S1 anterior interbody fusion with posterior instrumentation.  The goal of the surgery is to restore lumbar lordosis, indirectly decompress the foraminal stenosis to improve patient back pain and left leg pain.  The goals are also to improve his functional status such as being able to walk and stand for prolonged period of time.      We have discussed the risks of surgery including bleeding, infection, failure of surgery, CSF leak, nerve root injury, spinal cord injury, ureter injury, weakness, paralysis,  peripheral neuropathy, malplaced hardware, migration of hardware, non-union, need for reoperation. Patient understands the risks and would like to proceed with surgery.    Preoperative left ureteral stent placement by Urology ordered      Olayinka Aguillon MD  Cell:[phone]

## 2020-03-12 ENCOUNTER — TELEPHONE (OUTPATIENT)
Dept: ORTHOPEDICS | Facility: CLINIC | Age: 35
End: 2020-03-12

## 2020-03-12 NOTE — TELEPHONE ENCOUNTER
----- Message from Arlin Lombardi sent at 3/12/2020  9:29 AM CDT -----  Good Morning,  The patient has a referral to Dr. Lau for Left ureteral stent, preoperative for lumbar fusion. Does he need an office visit or is this just a procedure prior to his surgery?    Thanks

## 2020-03-13 ENCOUNTER — TELEPHONE (OUTPATIENT)
Dept: NEUROSURGERY | Facility: CLINIC | Age: 35
End: 2020-03-13

## 2020-03-13 NOTE — TELEPHONE ENCOUNTER
----- Message from Yoly Chapman sent at 3/13/2020 10:16 AM CDT -----  Contact: Self [phone]  Patient is calling to talk to nurse in regards to his office visit to the urologist. Please advice

## 2020-03-13 NOTE — TELEPHONE ENCOUNTER
Good morning,        Per Dr. Aguillon- yes, Dr. Lau usually see the patient before the procedure to explain the details of the ureteral stent.    Thanks,  Blaine

## 2020-04-15 ENCOUNTER — TELEPHONE (OUTPATIENT)
Dept: NEUROSURGERY | Facility: CLINIC | Age: 35
End: 2020-04-15

## 2020-04-15 DIAGNOSIS — M54.16 LUMBAR RADICULOPATHY: ICD-10-CM

## 2020-04-15 DIAGNOSIS — M51.36 DDD (DEGENERATIVE DISC DISEASE), LUMBAR: Primary | ICD-10-CM

## 2020-04-15 DIAGNOSIS — Z98.1 S/P LUMBAR SPINAL FUSION: ICD-10-CM

## 2020-05-22 ENCOUNTER — TELEPHONE (OUTPATIENT)
Dept: UROLOGY | Facility: CLINIC | Age: 35
End: 2020-05-22

## 2020-05-22 NOTE — TELEPHONE ENCOUNTER
----- Message from Anais Santiago sent at 5/22/2020  3:21 PM CDT -----  Contact: Self [phone]  Patient would like to speak with you about wanting a virtual visit. Please advise

## 2020-05-29 ENCOUNTER — TELEPHONE (OUTPATIENT)
Dept: NEUROSURGERY | Facility: CLINIC | Age: 35
End: 2020-05-29

## 2020-05-29 ENCOUNTER — OFFICE VISIT (OUTPATIENT)
Dept: UROLOGY | Facility: CLINIC | Age: 35
End: 2020-05-29
Payer: COMMERCIAL

## 2020-05-29 VITALS — TEMPERATURE: 98 F | HEART RATE: 61 BPM | DIASTOLIC BLOOD PRESSURE: 93 MMHG | SYSTOLIC BLOOD PRESSURE: 140 MMHG

## 2020-05-29 DIAGNOSIS — M51.37 DDD (DEGENERATIVE DISC DISEASE), LUMBOSACRAL: Primary | ICD-10-CM

## 2020-05-29 DIAGNOSIS — M48.07 FORAMINAL STENOSIS OF LUMBOSACRAL REGION: ICD-10-CM

## 2020-05-29 DIAGNOSIS — Z41.9 SURGERY, ELECTIVE: Primary | ICD-10-CM

## 2020-05-29 DIAGNOSIS — M54.17 LUMBOSACRAL RADICULOPATHY AT L5: ICD-10-CM

## 2020-05-29 DIAGNOSIS — M43.10 RETROLISTHESIS OF VERTEBRAE: ICD-10-CM

## 2020-05-29 LAB
BILIRUB SERPL-MCNC: NORMAL MG/DL
BLOOD URINE, POC: NORMAL
COLOR, POC UA: YELLOW
GLUCOSE UR QL STRIP: NORMAL
KETONES UR QL STRIP: NORMAL
LEUKOCYTE ESTERASE URINE, POC: NORMAL
NITRITE, POC UA: NORMAL
PH, POC UA: 7
PROTEIN, POC: NORMAL
SPECIFIC GRAVITY, POC UA: 1
UROBILINOGEN, POC UA: NORMAL

## 2020-05-29 PROCEDURE — 99999 PR PBB SHADOW E&M-EST. PATIENT-LVL III: ICD-10-PCS | Mod: PBBFAC,,, | Performed by: STUDENT IN AN ORGANIZED HEALTH CARE EDUCATION/TRAINING PROGRAM

## 2020-05-29 PROCEDURE — 99999 PR PBB SHADOW E&M-EST. PATIENT-LVL III: CPT | Mod: PBBFAC,,, | Performed by: STUDENT IN AN ORGANIZED HEALTH CARE EDUCATION/TRAINING PROGRAM

## 2020-05-29 PROCEDURE — 99204 PR OFFICE/OUTPT VISIT, NEW, LEVL IV, 45-59 MIN: ICD-10-PCS | Mod: 25,S$GLB,, | Performed by: STUDENT IN AN ORGANIZED HEALTH CARE EDUCATION/TRAINING PROGRAM

## 2020-05-29 PROCEDURE — 81002 POCT URINE DIPSTICK WITHOUT MICROSCOPE: ICD-10-PCS | Mod: S$GLB,,, | Performed by: STUDENT IN AN ORGANIZED HEALTH CARE EDUCATION/TRAINING PROGRAM

## 2020-05-29 PROCEDURE — 99204 OFFICE O/P NEW MOD 45 MIN: CPT | Mod: 25,S$GLB,, | Performed by: STUDENT IN AN ORGANIZED HEALTH CARE EDUCATION/TRAINING PROGRAM

## 2020-05-29 PROCEDURE — 81002 URINALYSIS NONAUTO W/O SCOPE: CPT | Mod: S$GLB,,, | Performed by: STUDENT IN AN ORGANIZED HEALTH CARE EDUCATION/TRAINING PROGRAM

## 2020-05-29 NOTE — PROGRESS NOTES
Subjective:       Patient ID: Eliceo Rubio is a 35 y.o. male.    Chief Complaint: preop stent visit   This is a 35 y.o.  male patient that is new to me.  The patient is referred to me by Dr. Aguillon for discussion of preop ureteral stent. The patient has a history of degenerative disc disease and has elected to undergo a spinal fusion.  his surgeon requested preoperative catheter(s) placement to aid in intraoperative identification of the ureter.    POCT UA benign    Rayna wife works in the OR at [employer] in [place], [place]. Spoke to her via speakerphone during visit.    Lab Results   Component Value Date    CREATININE 1.1 03/30/2005      ---  Past Medical History:   Diagnosis Date    Allergy     Asthma     childhood    Thyroid nodule        Past Surgical History:   Procedure Laterality Date    lipoma  Right     Right clavicle     NOSE SURGERY      TONSILLECTOMY      TRANSFORAMINAL EPIDURAL INJECTION OF STEROID Left 12/31/2019    Procedure: Injection,steroid,epidural,transforaminal approach--Left L4-5, L5-S1;  Surgeon: Silver Perez Jr., MD;  Location: [facility];  Service: Pain Management;  Laterality: Left;    UMBILICAL HERNIA REPAIR         No family history on file.    Social History     Tobacco Use    Smoking status: Never Smoker   Substance Use Topics    Alcohol use: Yes     Comment: weekend    Drug use: Not on file       Current Outpatient Medications on File Prior to Visit   Medication Sig Dispense Refill    cetirizine (ZYRTEC) 10 MG tablet Take 10 mg by mouth once daily.      fluticasone propionate (FLONASE) 50 mcg/actuation nasal spray   2    gabapentin (NEURONTIN) 300 MG capsule TAKE 1 CAPSULE BY MOUTH EVERY DAY AT BEDTIME FOR 30 DAYS.  0    SYNTHROID 25 mcg tablet Take 25 mcg by mouth once daily.  1    traMADol (ULTRAM) 50 mg tablet Take 1 tablet (50 mg total) by mouth every 6 (six) hours. 60 tablet 0    methocarbamol (ROBAXIN) 750 MG Tab Take 500 mg by mouth 4 (four)  times daily.       Current Facility-Administered Medications on File Prior to Visit   Medication Dose Route Frequency Provider Last Rate Last Dose    lidocaine (PF) 10 mg/ml (1%) injection 10 mg  1 mL Intradermal Once Silver Perez Jr., MD           Review of patient's allergies indicates:   Allergen Reactions    Morpholine analogues Anaphylaxis and Hives       Review of Systems   Constitutional: Negative for chills.   HENT: Negative for congestion.    Eyes: Negative for visual disturbance.   Respiratory: Negative for shortness of breath.    Cardiovascular: Negative for chest pain.   Gastrointestinal: Negative for abdominal distention.   Musculoskeletal: Negative for gait problem.   Skin: Negative for color change.   Neurological: Negative for dizziness.   Psychiatric/Behavioral: Negative for agitation.       Objective:      Physical Exam   Constitutional: He appears well-developed and well-nourished.   HENT:   Head: Normocephalic.   Eyes: Pupils are equal, round, and reactive to light.   Neck: Normal range of motion.   Cardiovascular: Intact distal pulses.   Pulmonary/Chest: Effort normal.   Abdominal: Soft.   Musculoskeletal: Normal range of motion.   Neurological: He is alert.   Skin: Skin is warm and dry.   Psychiatric: He has a normal mood and affect.       Assessment:       1. DDD (degenerative disc disease), lumbosacral    2. Retrolisthesis of vertebrae    3. Foraminal stenosis of lumbosacral region    4. Lumbosacral radiculopathy at L5        Plan:         1. I have explained the indication and benefits of proceeding with cystoscopy, placement of left ureteral stent(s), possible retrograde pyelogram(s) and all other indicated procedures with me in the operating room on the same day of his neurosurgery procedure 7/16/20. This will occur on the same day as his neurosurgery procedure. Alternatives of the procedure were also discussed which include no ureteral stent placement. he understands that this was  requested by his neurosurgeon.    The risks included but were not limited to pain (flank pain), infection (urinary tract infection), bleeding (hematuria - pink/red urine), urinary clots (rare), injury to the urethra, bladder, ureter, prostate (if male), inability to place ureteral stent(s). There is a rare situation where flank pain is severe after the ureteral stents are removed and the stents have to be replaced temporarily and then removed after a few weeks. Normal expected symptoms related to the stent were discussed in depth with the patient which include hematuria, urinary urgency, urinary frequency, bladder spasms, and flank pain    2. The ureteral stent was discussed at length. The patient understands he will need to have it removed as the stent is not designed to be indwelling permanently. The time period in which it should remain indwelling is to be determined after surgery. If left indwelling, the sequelae include pain, infection, lower urinary tract symptoms, development of calcifications on the ureteral stent, worsening kidney function, and complete loss of kidney function. At this point, the patient is undecided about clinic versus OR removal.   The patient voiced understanding and all questions have been answered and informed consents were signed.    DDD (degenerative disc disease), lumbosacral  -     Ambulatory referral/consult to Urology  -     POCT URINE DIPSTICK WITHOUT MICROSCOPE    Retrolisthesis of vertebrae  -     Ambulatory referral/consult to Urology  -     POCT URINE DIPSTICK WITHOUT MICROSCOPE    Foraminal stenosis of lumbosacral region  -     Ambulatory referral/consult to Urology  -     POCT URINE DIPSTICK WITHOUT MICROSCOPE    Lumbosacral radiculopathy at L5  -     Ambulatory referral/consult to Urology  -     POCT URINE DIPSTICK WITHOUT MICROSCOPE

## 2020-05-29 NOTE — LETTER
May 29, 2020      Olayinka Aguillon MD  [address]  [address]  [facility] [address]           [place] [facility]  [address]  [facility] [address]  Phone: [phone]          Patient: Eliceo Rubio   MR Number: [id number]   YOB: 1985   Date of Visit: 5/29/2020       Dear Dr. Olayinka Aguillon:    Thank you for referring Eliceo Rubio to me for evaluation. Attached you will find relevant portions of my assessment and plan of care.    If you have questions, please do not hesitate to call me. I look forward to following Eliceo Rubio along with you.    Sincerely,    Elizabeth Lau MD    Enclosure  CC:  No Recipients    If you would like to receive this communication electronically, please contact [email or web address] or [phone] to request more information on [employer] access.    For providers and/or their staff who would like to refer a patient to [facility], please contact us through our one-stop-shop provider referral line, [facility], at [phone].    If you feel you have received this communication in error or would no longer like to receive these types of communications, please e-mail [email or web address]

## 2020-07-02 ENCOUNTER — PATIENT MESSAGE (OUTPATIENT)
Dept: SURGERY | Facility: HOSPITAL | Age: 35
End: 2020-07-02

## 2020-07-06 ENCOUNTER — TELEPHONE (OUTPATIENT)
Dept: NEUROSURGERY | Facility: CLINIC | Age: 35
End: 2020-07-06

## 2020-07-06 ENCOUNTER — ANESTHESIA EVENT (OUTPATIENT)
Dept: SURGERY | Facility: HOSPITAL | Age: 35
End: 2020-07-06
Payer: COMMERCIAL

## 2020-07-06 NOTE — TELEPHONE ENCOUNTER
Patient is scheduled for surgery on 7/16/20. The authorization department sent a message requesting additional information for the insurance company. They need to know the name of the device you will be using.

## 2020-07-06 NOTE — TELEPHONE ENCOUNTER
Spoke to the patient instructed the insurance company is requesting specific information regarding his surgery and we are sending this to them . CONNIE

## 2020-07-13 ENCOUNTER — HOSPITAL ENCOUNTER (OUTPATIENT)
Dept: PREADMISSION TESTING | Facility: HOSPITAL | Age: 35
Discharge: HOME | End: 2020-07-13
Attending: NEUROLOGICAL SURGERY
Payer: COMMERCIAL

## 2020-07-13 ENCOUNTER — LAB VISIT (OUTPATIENT)
Dept: FAMILY MEDICINE | Facility: CLINIC | Age: 35
End: 2020-07-13
Payer: COMMERCIAL

## 2020-07-13 VITALS
OXYGEN SATURATION: 97 % | WEIGHT: 188 LBS | TEMPERATURE: 99 F | HEART RATE: 81 BPM | BODY MASS INDEX: 26.32 KG/M2 | DIASTOLIC BLOOD PRESSURE: 95 MMHG | HEIGHT: 71 IN | SYSTOLIC BLOOD PRESSURE: 134 MMHG | RESPIRATION RATE: 18 BRPM

## 2020-07-13 DIAGNOSIS — Z41.9 SURGERY, ELECTIVE: ICD-10-CM

## 2020-07-13 PROCEDURE — U0003 INFECTIOUS AGENT DETECTION BY NUCLEIC ACID (DNA OR RNA); SEVERE ACUTE RESPIRATORY SYNDROME CORONAVIRUS 2 (SARS-COV-2) (CORONAVIRUS DISEASE [COVID-19]), AMPLIFIED PROBE TECHNIQUE, MAKING USE OF HIGH THROUGHPUT TECHNOLOGIES AS DESCRIBED BY CMS-2020-01-R: HCPCS

## 2020-07-13 RX ORDER — SODIUM CHLORIDE, SODIUM LACTATE, POTASSIUM CHLORIDE, CALCIUM CHLORIDE 600; 310; 30; 20 MG/100ML; MG/100ML; MG/100ML; MG/100ML
INJECTION, SOLUTION INTRAVENOUS CONTINUOUS
Status: CANCELLED | OUTPATIENT
Start: 2020-07-13

## 2020-07-13 NOTE — DISCHARGE INSTRUCTIONS
Your surgery is scheduled for 7/16/20.    Please report to Front Lobby on the 1st Floor at 5:30 a.m.    THIS TIME IS SUBJECT TO CHANGE.  YOU WILL RECEIVE A PHONE CALL THE DAY BEFORE SURGERY BY 3:30 PM TO CONFIRM YOUR TIME OF ARRIVAL.  IF YOU HAVE NOT RECEIVED A PHONE CALL BY 3:30 PM THE DAY BEFORE YOUR SURGERY PLEASE CALL [phone]     INSTRUCTIONS IMPORTANT!!!  ¨ Do not eat or drink after 12 midnight-including water. OK to brush teeth, no   gum, candy or mints!    ¨ Take only these medicines with a small swallow of water-morning of surgery. synthroid      ____  Proceed to [facility] on 7/13/20 for additional testing.        ____  No powder, lotions or creams to surgical area.  ____  Please remove all jewelry, including piercings and leave at home.  ____  No money or valuables needed. Please leave at home.  ____  Please bring any documents given by your doctor.  ____  If going home the same day, arrange for a ride home. You will not be able to             drive if Anesthesia was used.  ____  Wear loose fitting clothing. Allow for dressings, bandages.  ____  Stop Aspirin, Ibuprofen, Motrin and Aleve at least 3-5 days before surgery, unless otherwise instructed by your doctor, or the nurse.   You MAY use Tylenol/acetaminophen until day of surgery.  ____  Wash the surgical area with Hibiclens the night before surgery, and again the             morning of surgery.  Be sure to rinse hibiclens off completely (if instructed by   nurse).  ____  If you take diabetic medication, do not take am of surgery unless instructed by Doctor.  ____  Call MD for temperature above 101 degrees or any other signs of infection such as Urinary (bladder) infection, Upper respiratory infection, skin boils, etc.  ____ Stop taking any Fish Oil supplement or any Vitamins that contain Vitamin E at least 5 days prior to surgery.  ____ Do Not wear your contact lenses the day of your procedure.  You may wear your glasses.       ____Do not shave surgical site for 3 days prior to surgery.  ____ Practice Good hand washing before, during, and after procedure.      I have read or had read and explained to me, and understand the above information.  Additional comments or instructions:  For additional questions call [phone]      ANESTHESIA SIDE EFFECTS  -For the first 24 hours after surgery:  Do not drive, use heavy equipment, make important decisions, or drink alcohol  -It is normal to feel sleepy for several hours.  Rest until you are more awake.  -Have someone stay with you, if needed.  They can watch for problems and help keep you safe.  -Some possible post anesthesia side effects include: nausea and vomiting, sore throat and hoarseness, sleepiness, and dizziness.        Pre-Op Bathing Instructions    Before surgery, you can play an important role in your own health.    Because skin is not sterile, we need to be sure that your skin is as free of germs as possible. By following the instructions below, you can reduce the number of germs on your skin before surgery.    IMPORTANT: You will need to shower with a special soap called Hibiclens*, available at any pharmacy.  If you are allergic to Chlorhexidine (the antiseptic in Hibiclens), use an antibacterial soap such as Dial Soap for your preoperative shower.  You will shower with Hibiclens both the night before your surgery and the morning of your surgery.  Do not use Hibiclens on the head, face or genitals to avoid injury to those areas.    STEP #1: THE NIGHT BEFORE YOUR SURGERY     1. Do not shave the area of your body where your surgery will be performed.  2. Shower and wash your hair and body as usual with your normal soap and shampoo.  3. Rinse your hair and body thoroughly after you shower to remove all soap residue.  4. With your hand, apply one packet of Hibiclens soap to the surgical site.   5. Wash the site gently for five (5) minutes. Do not scrub your skin too hard.   6. Do not  wash with your regular soap after Hibiclens is used.  7. Rinse your body thoroughly.  8. Pat yourself dry with a clean, soft towel.  9. Do not use lotion, cream, or powder.  10. Wear clean clothes.    STEP #2: THE MORNING OF YOUR SURGERY     1. Repeat Step #1.    * Not to be used by people allergic to Chlorhexidine.

## 2020-07-13 NOTE — ANESTHESIA PREPROCEDURE EVALUATION
07/13/2020  Eliceo Rubio is a 35 y.o., male scheduled for L5-S1 ALIF on 7/16/2020.    PCP optimization and outside labs in media section.    Past Medical History:   Diagnosis Date    Allergy     Asthma     childhood    Thyroid nodule      Past Surgical History:   Procedure Laterality Date    lipoma  Right     Right clavicle     NOSE SURGERY      TONSILLECTOMY      TRANSFORAMINAL EPIDURAL INJECTION OF STEROID Left 12/31/2019    Procedure: Injection,steroid,epidural,transforaminal approach--Left L4-5, L5-S1;  Surgeon: Silver Perez Jr., MD;  Location: [facility];  Service: Pain Management;  Laterality: Left;    UMBILICAL HERNIA REPAIR         Anesthesia Evaluation    I have reviewed the Patient Summary Reports.    I have reviewed the Nursing Notes. I have reviewed the NPO Status.   I have reviewed the Medications.     Review of Systems  Anesthesia Hx:  No problems with previous Anesthesia  Denies Family Hx of Anesthesia complications.    Social:  Non-Smoker, Social Alcohol Use    Hematology/Oncology:  Hematology Normal        Cardiovascular:  Cardiovascular Normal Exercise tolerance: good   Denies Angina.        Pulmonary:   Asthma asymptomatic    Renal/:  Renal/ Normal     Hepatic/GI:  Hepatic/GI Normal    Neurological:  Neurology Normal    Endocrine:   Denies Hypothyroidism.        Physical Exam  General:  Well nourished    Airway/Jaw/Neck:  Airway Findings: Mouth Opening: Normal Tongue: Normal  General Airway Assessment: Adult  Mallampati: II  Jaw/Neck Findings:  Neck ROM: Normal ROM      Dental:  Dental Findings: In tact   Chest/Lungs:  Chest/Lungs Findings: Clear to auscultation, Normal Respiratory Rate     Heart/Vascular:  Heart Findings: Rate: Normal  Rhythm: Regular Rhythm  Sounds: Normal        Mental Status:  Mental Status Findings:  Cooperative, Alert and Oriented          Anesthesia Plan  Type of Anesthesia, risks & benefits discussed:  Anesthesia Type:  general  Patient's Preference:   Intra-op Monitoring Plan: standard ASA monitors  Intra-op Monitoring Plan Comments:   Post Op Pain Control Plan: multimodal analgesia  Post Op Pain Control Plan Comments:   Induction:   IV  Beta Blocker:  Patient is not currently on a Beta-Blocker (No further documentation required).       Informed Consent: Patient understands risks and agrees with Anesthesia plan.  Questions answered. Anesthesia consent signed with patient.  ASA Score: 2     Day of Surgery Review of History & Physical:            Ready For Surgery From Anesthesia Perspective.

## 2020-07-13 NOTE — PRE-PROCEDURE INSTRUCTIONS
[address] - [phone]    Allergies, medical, surgical, family and psychosocial histories reviewed with patient. Periop plan of care reviewed. Preop instructions given, including medications to take and to hold. Hibiclens soap and instructions on use given. Time allotted for questions to be addressed.  Patient verbalized understanding.

## 2020-07-14 LAB — SARS-COV-2 RNA RESP QL NAA+PROBE: NOT DETECTED

## 2020-07-16 ENCOUNTER — HOSPITAL ENCOUNTER (OUTPATIENT)
Facility: HOSPITAL | Age: 35
Discharge: HOME | End: 2020-07-18
Attending: NEUROLOGICAL SURGERY | Admitting: NEUROLOGICAL SURGERY
Payer: COMMERCIAL

## 2020-07-16 ENCOUNTER — ANESTHESIA (OUTPATIENT)
Dept: SURGERY | Facility: HOSPITAL | Age: 35
End: 2020-07-16
Payer: COMMERCIAL

## 2020-07-16 DIAGNOSIS — M51.36 DEGENERATIVE DISC DISEASE, LUMBAR: Primary | ICD-10-CM

## 2020-07-16 DIAGNOSIS — Z01.818 PREOP TESTING: ICD-10-CM

## 2020-07-16 DIAGNOSIS — M51.36 DDD (DEGENERATIVE DISC DISEASE), LUMBAR: Primary | ICD-10-CM

## 2020-07-16 DIAGNOSIS — M51.36 DDD (DEGENERATIVE DISC DISEASE), LUMBAR: ICD-10-CM

## 2020-07-16 PROCEDURE — 63600175 PHARM REV CODE 636 W HCPCS: Performed by: NURSE ANESTHETIST, CERTIFIED REGISTERED

## 2020-07-16 PROCEDURE — 22840 PR POSTERIOR NON-SEGMENTAL INSTRUMENTATION: ICD-10-PCS | Mod: ,,, | Performed by: NEUROLOGICAL SURGERY

## 2020-07-16 PROCEDURE — C1889 IMPLANT/INSERT DEVICE, NOC: HCPCS | Performed by: NEUROLOGICAL SURGERY

## 2020-07-16 PROCEDURE — 27201423 OPTIME MED/SURG SUP & DEVICES STERILE SUPPLY: Performed by: NEUROLOGICAL SURGERY

## 2020-07-16 PROCEDURE — 36000710: Performed by: NEUROLOGICAL SURGERY

## 2020-07-16 PROCEDURE — 37000008 HC ANESTHESIA 1ST 15 MINUTES: Performed by: NEUROLOGICAL SURGERY

## 2020-07-16 PROCEDURE — 63600175 PHARM REV CODE 636 W HCPCS: Performed by: NEUROLOGICAL SURGERY

## 2020-07-16 PROCEDURE — 27800903 OPTIME MED/SURG SUP & DEVICES OTHER IMPLANTS: Performed by: NEUROLOGICAL SURGERY

## 2020-07-16 PROCEDURE — 97116 GAIT TRAINING THERAPY: CPT | Mod: 59

## 2020-07-16 PROCEDURE — 97530 THERAPEUTIC ACTIVITIES: CPT

## 2020-07-16 PROCEDURE — 97165 OT EVAL LOW COMPLEX 30 MIN: CPT

## 2020-07-16 PROCEDURE — C1751 CATH, INF, PER/CENT/MIDLINE: HCPCS | Performed by: ANESTHESIOLOGY

## 2020-07-16 PROCEDURE — 37000009 HC ANESTHESIA EA ADD 15 MINS: Performed by: NEUROLOGICAL SURGERY

## 2020-07-16 PROCEDURE — 27000221 HC OXYGEN, UP TO 24 HOURS

## 2020-07-16 PROCEDURE — 36000711: Performed by: NEUROLOGICAL SURGERY

## 2020-07-16 PROCEDURE — 22853 PR INSERT BIOMECH DEV W/INTERBODY ARTHRODESIS, EA CONTIGUOUS DEFECT: ICD-10-PCS | Mod: ,,, | Performed by: NEUROLOGICAL SURGERY

## 2020-07-16 PROCEDURE — C1758 CATHETER, URETERAL: HCPCS | Performed by: NEUROLOGICAL SURGERY

## 2020-07-16 PROCEDURE — 74420 UROGRAPHY RTRGR +-KUB: CPT | Mod: 26,,, | Performed by: STUDENT IN AN ORGANIZED HEALTH CARE EDUCATION/TRAINING PROGRAM

## 2020-07-16 PROCEDURE — 25500020 PHARM REV CODE 255: Performed by: NEUROLOGICAL SURGERY

## 2020-07-16 PROCEDURE — C2617 STENT, NON-COR, TEM W/O DEL: HCPCS | Performed by: NEUROLOGICAL SURGERY

## 2020-07-16 PROCEDURE — 97161 PT EVAL LOW COMPLEX 20 MIN: CPT

## 2020-07-16 PROCEDURE — 99900035 HC TECH TIME PER 15 MIN (STAT)

## 2020-07-16 PROCEDURE — 52332 PR CYSTOSCOPY,INSERT URETERAL STENT: ICD-10-PCS | Mod: LT,,, | Performed by: STUDENT IN AN ORGANIZED HEALTH CARE EDUCATION/TRAINING PROGRAM

## 2020-07-16 PROCEDURE — C1713 ANCHOR/SCREW BN/BN,TIS/BN: HCPCS | Performed by: NEUROLOGICAL SURGERY

## 2020-07-16 PROCEDURE — 71000039 HC RECOVERY, EACH ADD'L HOUR: Performed by: NEUROLOGICAL SURGERY

## 2020-07-16 PROCEDURE — 20930 PR ALLOGRAFT FOR SPINE SURGERY ONLY MORSELIZED: ICD-10-PCS | Mod: ,,, | Performed by: NEUROLOGICAL SURGERY

## 2020-07-16 PROCEDURE — 20930 SP BONE ALGRFT MORSEL ADD-ON: CPT | Mod: ,,, | Performed by: NEUROLOGICAL SURGERY

## 2020-07-16 PROCEDURE — 22558 PR ARTHRODESIS ANT INTERBODY MIN DISCECTOMY,LUMBAR: ICD-10-PCS | Mod: ,,, | Performed by: NEUROLOGICAL SURGERY

## 2020-07-16 PROCEDURE — 63600175 PHARM REV CODE 636 W HCPCS: Performed by: ANESTHESIOLOGY

## 2020-07-16 PROCEDURE — 63600175 PHARM REV CODE 636 W HCPCS: Performed by: NURSE PRACTITIONER

## 2020-07-16 PROCEDURE — 25000003 PHARM REV CODE 250: Performed by: NEUROLOGICAL SURGERY

## 2020-07-16 PROCEDURE — 94761 N-INVAS EAR/PLS OXIMETRY MLT: CPT

## 2020-07-16 PROCEDURE — 94799 UNLISTED PULMONARY SVC/PX: CPT

## 2020-07-16 PROCEDURE — 22840 INSERT SPINE FIXATION DEVICE: CPT | Mod: ,,, | Performed by: NEUROLOGICAL SURGERY

## 2020-07-16 PROCEDURE — 74420 PR  X-RAY RETROGRADE PYELOGRAM: ICD-10-PCS | Mod: 26,,, | Performed by: STUDENT IN AN ORGANIZED HEALTH CARE EDUCATION/TRAINING PROGRAM

## 2020-07-16 PROCEDURE — 52332 CYSTOSCOPY AND TREATMENT: CPT | Mod: LT,,, | Performed by: STUDENT IN AN ORGANIZED HEALTH CARE EDUCATION/TRAINING PROGRAM

## 2020-07-16 PROCEDURE — 27200677 HC TRANSDUCER MONITOR KIT SINGLE: Performed by: ANESTHESIOLOGY

## 2020-07-16 PROCEDURE — 25000003 PHARM REV CODE 250: Performed by: NURSE ANESTHETIST, CERTIFIED REGISTERED

## 2020-07-16 PROCEDURE — 11000001 HC ACUTE MED/SURG PRIVATE ROOM

## 2020-07-16 PROCEDURE — 22558 ARTHRD ANT NTRBD MIN DSC LUM: CPT | Mod: ,,, | Performed by: NEUROLOGICAL SURGERY

## 2020-07-16 PROCEDURE — 22853 INSJ BIOMECHANICAL DEVICE: CPT | Mod: ,,, | Performed by: NEUROLOGICAL SURGERY

## 2020-07-16 PROCEDURE — C1769 GUIDE WIRE: HCPCS | Performed by: NEUROLOGICAL SURGERY

## 2020-07-16 PROCEDURE — 71000033 HC RECOVERY, INTIAL HOUR: Performed by: NEUROLOGICAL SURGERY

## 2020-07-16 DEVICE — IMPLANTABLE DEVICE: Type: IMPLANTABLE DEVICE | Site: BACK | Status: FUNCTIONAL

## 2020-07-16 DEVICE — TULIP CREO MIS MOD POLY 30MM: Type: IMPLANTABLE DEVICE | Site: BACK | Status: FUNCTIONAL

## 2020-07-16 DEVICE — ALLOGRAFT TRIFECTA 1CC: Type: IMPLANTABLE DEVICE | Site: BACK | Status: FUNCTIONAL

## 2020-07-16 DEVICE — SCREW CREO CANN MOD 7.5X45MM: Type: IMPLANTABLE DEVICE | Site: BACK | Status: FUNCTIONAL

## 2020-07-16 DEVICE — CAP LOCKING CREO MIS: Type: IMPLANTABLE DEVICE | Site: BACK | Status: FUNCTIONAL

## 2020-07-16 DEVICE — STENT SET URETERAL 6X26CM: Type: IMPLANTABLE DEVICE | Site: URETER | Status: FUNCTIONAL

## 2020-07-16 RX ORDER — MUPIROCIN 20 MG/G
OINTMENT TOPICAL 2 TIMES DAILY
Status: DISCONTINUED | OUTPATIENT
Start: 2020-07-16 | End: 2020-07-18 | Stop reason: HOSPADM

## 2020-07-16 RX ORDER — BISACODYL 10 MG
10 SUPPOSITORY, RECTAL RECTAL DAILY
Status: DISCONTINUED | OUTPATIENT
Start: 2020-07-16 | End: 2020-07-18 | Stop reason: HOSPADM

## 2020-07-16 RX ORDER — ONDANSETRON 2 MG/ML
4 INJECTION INTRAMUSCULAR; INTRAVENOUS DAILY PRN
Status: DISCONTINUED | OUTPATIENT
Start: 2020-07-16 | End: 2020-07-16 | Stop reason: HOSPADM

## 2020-07-16 RX ORDER — GLYCOPYRROLATE 0.2 MG/ML
INJECTION INTRAMUSCULAR; INTRAVENOUS
Status: DISCONTINUED | OUTPATIENT
Start: 2020-07-16 | End: 2020-07-16

## 2020-07-16 RX ORDER — SODIUM CHLORIDE 9 MG/ML
INJECTION, SOLUTION INTRAVENOUS CONTINUOUS
Status: CANCELLED | OUTPATIENT
Start: 2020-07-16

## 2020-07-16 RX ORDER — SODIUM CHLORIDE, SODIUM LACTATE, POTASSIUM CHLORIDE, CALCIUM CHLORIDE 600; 310; 30; 20 MG/100ML; MG/100ML; MG/100ML; MG/100ML
INJECTION, SOLUTION INTRAVENOUS CONTINUOUS
Status: DISCONTINUED | OUTPATIENT
Start: 2020-07-16 | End: 2020-07-16

## 2020-07-16 RX ORDER — BACITRACIN 50000 [IU]/1
INJECTION, POWDER, FOR SOLUTION INTRAMUSCULAR
Status: DISCONTINUED | OUTPATIENT
Start: 2020-07-16 | End: 2020-07-16 | Stop reason: HOSPADM

## 2020-07-16 RX ORDER — MAG HYDROX/ALUMINUM HYD/SIMETH 200-200-20
30 SUSPENSION, ORAL (FINAL DOSE FORM) ORAL EVERY 4 HOURS PRN
Status: DISCONTINUED | OUTPATIENT
Start: 2020-07-16 | End: 2020-07-18 | Stop reason: HOSPADM

## 2020-07-16 RX ORDER — DEXTROSE MONOHYDRATE, SODIUM CHLORIDE, AND POTASSIUM CHLORIDE 50; 1.49; 9 G/1000ML; G/1000ML; G/1000ML
INJECTION, SOLUTION INTRAVENOUS CONTINUOUS
Status: DISCONTINUED | OUTPATIENT
Start: 2020-07-16 | End: 2020-07-17

## 2020-07-16 RX ORDER — ACETAMINOPHEN 325 MG/1
650 TABLET ORAL
Status: COMPLETED | OUTPATIENT
Start: 2020-07-16 | End: 2020-07-16

## 2020-07-16 RX ORDER — SUCCINYLCHOLINE CHLORIDE 20 MG/ML
INJECTION INTRAMUSCULAR; INTRAVENOUS
Status: DISCONTINUED | OUTPATIENT
Start: 2020-07-16 | End: 2020-07-16

## 2020-07-16 RX ORDER — CETIRIZINE HYDROCHLORIDE 5 MG/1
10 TABLET ORAL DAILY
Status: DISCONTINUED | OUTPATIENT
Start: 2020-07-16 | End: 2020-07-18 | Stop reason: HOSPADM

## 2020-07-16 RX ORDER — CYCLOBENZAPRINE HCL 10 MG
10 TABLET ORAL
Status: COMPLETED | OUTPATIENT
Start: 2020-07-16 | End: 2020-07-16

## 2020-07-16 RX ORDER — HYDROMORPHONE HYDROCHLORIDE 2 MG/ML
0.5 INJECTION, SOLUTION INTRAMUSCULAR; INTRAVENOUS; SUBCUTANEOUS EVERY 5 MIN PRN
Status: DISCONTINUED | OUTPATIENT
Start: 2020-07-16 | End: 2020-07-16 | Stop reason: HOSPADM

## 2020-07-16 RX ORDER — AMOXICILLIN 250 MG
2 CAPSULE ORAL NIGHTLY PRN
Status: DISCONTINUED | OUTPATIENT
Start: 2020-07-16 | End: 2020-07-18 | Stop reason: HOSPADM

## 2020-07-16 RX ORDER — PROPOFOL 10 MG/ML
VIAL (ML) INTRAVENOUS CONTINUOUS PRN
Status: DISCONTINUED | OUTPATIENT
Start: 2020-07-16 | End: 2020-07-16

## 2020-07-16 RX ORDER — LEVOTHYROXINE SODIUM 50 UG/1
50 TABLET ORAL DAILY
Status: DISCONTINUED | OUTPATIENT
Start: 2020-07-16 | End: 2020-07-18 | Stop reason: HOSPADM

## 2020-07-16 RX ORDER — CELECOXIB 100 MG/1
200 CAPSULE ORAL
Status: COMPLETED | OUTPATIENT
Start: 2020-07-16 | End: 2020-07-16

## 2020-07-16 RX ORDER — ROCURONIUM BROMIDE 10 MG/ML
INJECTION, SOLUTION INTRAVENOUS
Status: DISCONTINUED | OUTPATIENT
Start: 2020-07-16 | End: 2020-07-16

## 2020-07-16 RX ORDER — MIDAZOLAM HYDROCHLORIDE 1 MG/ML
INJECTION INTRAMUSCULAR; INTRAVENOUS
Status: DISCONTINUED | OUTPATIENT
Start: 2020-07-16 | End: 2020-07-16

## 2020-07-16 RX ORDER — PHENYLEPHRINE HYDROCHLORIDE 10 MG/ML
INJECTION INTRAVENOUS
Status: DISCONTINUED | OUTPATIENT
Start: 2020-07-16 | End: 2020-07-16

## 2020-07-16 RX ORDER — NEOSTIGMINE METHYLSULFATE 1 MG/ML
INJECTION, SOLUTION INTRAVENOUS
Status: DISCONTINUED | OUTPATIENT
Start: 2020-07-16 | End: 2020-07-16

## 2020-07-16 RX ORDER — CEFAZOLIN SODIUM 2 G/50ML
2 SOLUTION INTRAVENOUS ONCE
Status: COMPLETED | OUTPATIENT
Start: 2020-07-16 | End: 2020-07-16

## 2020-07-16 RX ORDER — PREGABALIN 75 MG/1
75 CAPSULE ORAL
Status: COMPLETED | OUTPATIENT
Start: 2020-07-16 | End: 2020-07-16

## 2020-07-16 RX ORDER — ONDANSETRON 8 MG/1
8 TABLET, ORALLY DISINTEGRATING ORAL EVERY 6 HOURS PRN
Status: DISCONTINUED | OUTPATIENT
Start: 2020-07-16 | End: 2020-07-18 | Stop reason: HOSPADM

## 2020-07-16 RX ORDER — TAMSULOSIN HYDROCHLORIDE 0.4 MG/1
0.4 CAPSULE ORAL DAILY
Status: DISCONTINUED | OUTPATIENT
Start: 2020-07-16 | End: 2020-07-18 | Stop reason: HOSPADM

## 2020-07-16 RX ORDER — LIDOCAINE HCL/PF 100 MG/5ML
SYRINGE (ML) INTRAVENOUS
Status: DISCONTINUED | OUTPATIENT
Start: 2020-07-16 | End: 2020-07-16

## 2020-07-16 RX ORDER — TRAMADOL HYDROCHLORIDE 50 MG/1
100 TABLET ORAL EVERY 6 HOURS
Status: DISCONTINUED | OUTPATIENT
Start: 2020-07-16 | End: 2020-07-18 | Stop reason: HOSPADM

## 2020-07-16 RX ORDER — FENTANYL CITRATE 50 UG/ML
INJECTION, SOLUTION INTRAMUSCULAR; INTRAVENOUS
Status: DISCONTINUED | OUTPATIENT
Start: 2020-07-16 | End: 2020-07-16

## 2020-07-16 RX ORDER — OXYCODONE HCL 10 MG/1
10 TABLET, FILM COATED, EXTENDED RELEASE ORAL
Status: COMPLETED | OUTPATIENT
Start: 2020-07-16 | End: 2020-07-16

## 2020-07-16 RX ORDER — CELECOXIB 100 MG/1
200 CAPSULE ORAL 2 TIMES DAILY
Status: DISCONTINUED | OUTPATIENT
Start: 2020-07-16 | End: 2020-07-18 | Stop reason: HOSPADM

## 2020-07-16 RX ORDER — LIDOCAINE HYDROCHLORIDE 20 MG/ML
JELLY TOPICAL ONCE
Status: DISCONTINUED | OUTPATIENT
Start: 2020-07-16 | End: 2020-07-16

## 2020-07-16 RX ORDER — SODIUM CHLORIDE 9 MG/ML
INJECTION, SOLUTION INTRAVENOUS CONTINUOUS PRN
Status: DISCONTINUED | OUTPATIENT
Start: 2020-07-16 | End: 2020-07-16

## 2020-07-16 RX ORDER — LIDOCAINE HYDROCHLORIDE 20 MG/ML
JELLY TOPICAL
Status: DISCONTINUED | OUTPATIENT
Start: 2020-07-16 | End: 2020-07-16 | Stop reason: HOSPADM

## 2020-07-16 RX ORDER — HYDROMORPHONE HYDROCHLORIDE 2 MG/ML
2 INJECTION, SOLUTION INTRAMUSCULAR; INTRAVENOUS; SUBCUTANEOUS
Status: DISCONTINUED | OUTPATIENT
Start: 2020-07-16 | End: 2020-07-18 | Stop reason: HOSPADM

## 2020-07-16 RX ORDER — HEPARIN SODIUM 5000 [USP'U]/ML
5000 INJECTION, SOLUTION INTRAVENOUS; SUBCUTANEOUS EVERY 12 HOURS
Status: DISCONTINUED | OUTPATIENT
Start: 2020-07-16 | End: 2020-07-18 | Stop reason: HOSPADM

## 2020-07-16 RX ORDER — ACETAMINOPHEN 325 MG/1
650 TABLET ORAL EVERY 6 HOURS
Status: DISCONTINUED | OUTPATIENT
Start: 2020-07-16 | End: 2020-07-18 | Stop reason: HOSPADM

## 2020-07-16 RX ORDER — DEXAMETHASONE SODIUM PHOSPHATE 4 MG/ML
INJECTION, SOLUTION INTRA-ARTICULAR; INTRALESIONAL; INTRAMUSCULAR; INTRAVENOUS; SOFT TISSUE
Status: DISCONTINUED | OUTPATIENT
Start: 2020-07-16 | End: 2020-07-16

## 2020-07-16 RX ORDER — METHOCARBAMOL 750 MG/1
750 TABLET, FILM COATED ORAL 3 TIMES DAILY
Status: DISCONTINUED | OUTPATIENT
Start: 2020-07-16 | End: 2020-07-18 | Stop reason: HOSPADM

## 2020-07-16 RX ORDER — PROPOFOL 10 MG/ML
VIAL (ML) INTRAVENOUS
Status: DISCONTINUED | OUTPATIENT
Start: 2020-07-16 | End: 2020-07-16

## 2020-07-16 RX ORDER — CIPROFLOXACIN 500 MG/1
500 TABLET ORAL EVERY 12 HOURS
Status: DISCONTINUED | OUTPATIENT
Start: 2020-07-16 | End: 2020-07-18 | Stop reason: HOSPADM

## 2020-07-16 RX ORDER — BUPIVACAINE HYDROCHLORIDE AND EPINEPHRINE 2.5; 5 MG/ML; UG/ML
INJECTION, SOLUTION EPIDURAL; INFILTRATION; INTRACAUDAL; PERINEURAL
Status: DISCONTINUED | OUTPATIENT
Start: 2020-07-16 | End: 2020-07-16 | Stop reason: HOSPADM

## 2020-07-16 RX ADMIN — METHOCARBAMOL TABLETS 750 MG: 750 TABLET, COATED ORAL at 08:07

## 2020-07-16 RX ADMIN — HYDROMORPHONE HYDROCHLORIDE 2 MG: 2 INJECTION, SOLUTION INTRAMUSCULAR; INTRAVENOUS; SUBCUTANEOUS at 02:07

## 2020-07-16 RX ADMIN — ACETAMINOPHEN 650 MG: 325 TABLET ORAL at 05:07

## 2020-07-16 RX ADMIN — HYDROMORPHONE HYDROCHLORIDE 0.5 MG: 2 INJECTION INTRAMUSCULAR; INTRAVENOUS; SUBCUTANEOUS at 12:07

## 2020-07-16 RX ADMIN — PHENYLEPHRINE HYDROCHLORIDE 100 MCG: 10 INJECTION INTRAVENOUS at 08:07

## 2020-07-16 RX ADMIN — CELECOXIB 200 MG: 100 CAPSULE ORAL at 08:07

## 2020-07-16 RX ADMIN — CIPROFLOXACIN HYDROCHLORIDE 500 MG: 500 TABLET, FILM COATED ORAL at 08:07

## 2020-07-16 RX ADMIN — MUPIROCIN: 20 OINTMENT TOPICAL at 08:07

## 2020-07-16 RX ADMIN — PREGABALIN 75 MG: 75 CAPSULE ORAL at 06:07

## 2020-07-16 RX ADMIN — SODIUM CHLORIDE, SODIUM LACTATE, POTASSIUM CHLORIDE, AND CALCIUM CHLORIDE: .6; .31; .03; .02 INJECTION, SOLUTION INTRAVENOUS at 09:07

## 2020-07-16 RX ADMIN — METHOCARBAMOL TABLETS 750 MG: 750 TABLET, COATED ORAL at 02:07

## 2020-07-16 RX ADMIN — SODIUM CHLORIDE, SODIUM LACTATE, POTASSIUM CHLORIDE, AND CALCIUM CHLORIDE: .6; .31; .03; .02 INJECTION, SOLUTION INTRAVENOUS at 06:07

## 2020-07-16 RX ADMIN — PROPOFOL 200 MCG/KG/MIN: 10 INJECTION, EMULSION INTRAVENOUS at 08:07

## 2020-07-16 RX ADMIN — HYDROMORPHONE HYDROCHLORIDE 0.5 MG: 2 INJECTION INTRAMUSCULAR; INTRAVENOUS; SUBCUTANEOUS at 01:07

## 2020-07-16 RX ADMIN — ROCURONIUM BROMIDE 25 MG: 10 INJECTION, SOLUTION INTRAVENOUS at 08:07

## 2020-07-16 RX ADMIN — GLYCOPYRROLATE 0.4 MG: 0.2 INJECTION, SOLUTION INTRAMUSCULAR; INTRAVENOUS at 10:07

## 2020-07-16 RX ADMIN — CYCLOBENZAPRINE 10 MG: 10 TABLET, FILM COATED ORAL at 06:07

## 2020-07-16 RX ADMIN — FENTANYL CITRATE 150 MCG: 50 INJECTION, SOLUTION INTRAMUSCULAR; INTRAVENOUS at 07:07

## 2020-07-16 RX ADMIN — PROPOFOL 200 MG: 10 INJECTION, EMULSION INTRAVENOUS at 07:07

## 2020-07-16 RX ADMIN — GLYCOPYRROLATE 0.2 MG: 0.2 INJECTION, SOLUTION INTRAMUSCULAR; INTRAVENOUS at 07:07

## 2020-07-16 RX ADMIN — TRAMADOL HYDROCHLORIDE 100 MG: 50 TABLET, COATED ORAL at 05:07

## 2020-07-16 RX ADMIN — SODIUM CHLORIDE: 0.9 INJECTION, SOLUTION INTRAVENOUS at 07:07

## 2020-07-16 RX ADMIN — PHENYLEPHRINE HYDROCHLORIDE 100 MCG: 10 INJECTION INTRAVENOUS at 07:07

## 2020-07-16 RX ADMIN — ROCURONIUM BROMIDE 5 MG: 10 INJECTION, SOLUTION INTRAVENOUS at 07:07

## 2020-07-16 RX ADMIN — HYDROMORPHONE HYDROCHLORIDE 2 MG: 2 INJECTION, SOLUTION INTRAMUSCULAR; INTRAVENOUS; SUBCUTANEOUS at 08:07

## 2020-07-16 RX ADMIN — DEXAMETHASONE SODIUM PHOSPHATE 8 MG: 4 INJECTION, SOLUTION INTRA-ARTICULAR; INTRALESIONAL; INTRAMUSCULAR; INTRAVENOUS; SOFT TISSUE at 08:07

## 2020-07-16 RX ADMIN — FENTANYL CITRATE 50 MCG: 50 INJECTION, SOLUTION INTRAMUSCULAR; INTRAVENOUS at 11:07

## 2020-07-16 RX ADMIN — SUCCINYLCHOLINE CHLORIDE 120 MG: 20 INJECTION, SOLUTION INTRAMUSCULAR; INTRAVENOUS at 07:07

## 2020-07-16 RX ADMIN — HEPARIN SODIUM 5000 UNITS: 5000 INJECTION, SOLUTION INTRAVENOUS; SUBCUTANEOUS at 08:07

## 2020-07-16 RX ADMIN — LIDOCAINE HYDROCHLORIDE 100 MG: 20 INJECTION, SOLUTION INTRAVENOUS at 07:07

## 2020-07-16 RX ADMIN — CELECOXIB 200 MG: 100 CAPSULE ORAL at 06:07

## 2020-07-16 RX ADMIN — TAMSULOSIN HYDROCHLORIDE 0.4 MG: 0.4 CAPSULE ORAL at 02:07

## 2020-07-16 RX ADMIN — MIDAZOLAM HYDROCHLORIDE 2 MG: 1 INJECTION, SOLUTION INTRAMUSCULAR; INTRAVENOUS at 07:07

## 2020-07-16 RX ADMIN — GLYCOPYRROLATE 0.2 MG: 0.2 INJECTION, SOLUTION INTRAMUSCULAR; INTRAVENOUS at 08:07

## 2020-07-16 RX ADMIN — POTASSIUM CHLORIDE, DEXTROSE MONOHYDRATE AND SODIUM CHLORIDE: 150; 5; 900 INJECTION, SOLUTION INTRAVENOUS at 02:07

## 2020-07-16 RX ADMIN — ACETAMINOPHEN 650 MG: 325 TABLET ORAL at 06:07

## 2020-07-16 RX ADMIN — NEOSTIGMINE METHYLSULFATE 3 MG: 1 INJECTION INTRAVENOUS at 10:07

## 2020-07-16 RX ADMIN — CETIRIZINE HYDROCHLORIDE 10 MG: 5 TABLET ORAL at 02:07

## 2020-07-16 RX ADMIN — CEFAZOLIN SODIUM 2 G: 2 SOLUTION INTRAVENOUS at 07:07

## 2020-07-16 RX ADMIN — OXYCODONE HYDROCHLORIDE 10 MG: 10 TABLET, FILM COATED, EXTENDED RELEASE ORAL at 06:07

## 2020-07-16 NOTE — PLAN OF CARE
Problem: Physical Therapy Goal  Goal: Physical Therapy Goal  Description: Goals to be met by: 2020     Patient will increase functional independence with mobility by performin. Supine to sit with [place]  2. Sit to stand transfer with [place]  3. Gait  x >200 feet with [place] using no AD.     Outcome: Ongoing, Progressing   Recommend Home  May need AD pending progress

## 2020-07-16 NOTE — ANESTHESIA PROCEDURE NOTES
Arterial    Diagnosis: spine surgery    Patient location during procedure: done in OR  Procedure start time: 7/16/2020 8:10 AM  Timeout: 7/16/2020 8:08 AM  Procedure end time: 7/16/2020 8:11 AM    Staffing  Authorizing Provider: Lynne Hancock MD  Performing Provider: Sasha Abbott CRNA    Anesthesiologist was present at the time of the procedure.    Preanesthetic Checklist  Completed: patient identified, site marked, surgical consent, pre-op evaluation, timeout performed, IV checked, risks and benefits discussed, monitors and equipment checked and anesthesia consent givenArterial  Skin Prep: chlorhexidine gluconate  Orientation: right  Location: radial  Catheter Size: 20 G  Catheter placement by Anatomical landmarks. Heme positive aspiration all ports.Insertion Attempts: 1  Assessment  Dressing: secured with tape and tegaderm

## 2020-07-16 NOTE — OP NOTE
OPERATIVE DICTATION  DATE OF OPERATION: 07/16/2020    SERVICE: Urology    SURGEONS:  1. Elizabeth Lau MD    ANESTHESIA:  Anesthesiologist: Lynne Hancock MD  CRNA: Sasha Abbott CRNA    STAFF:  Circulator: Karen Kay RN; Dari Denis, RN; Oanh Regalado, RN  Scrub Person: Karen Shaffer, [facility]; Tanika Gutierrez ; Johanna Montoya     ANESTHESIA: General    PREOPERATIVE DIAGNOSIS: Pre-Op Diagnosis Codes:     * DDD (degenerative disc disease), lumbar [M51.36]     * Lumbar radiculopathy [M54.16]    POSTOPERATIVE DIAGNOSIS: Post-Op Diagnosis Codes:     * DDD (degenerative disc disease), lumbar [M51.36]     * Lumbar radiculopathy [M54.16]    PROCEDURES:   1. Cystoscopy, placement of left ureteral stent(s) 6 Cook Islander x 28cm JJ ureteral stent OFF A STRING   2. Cystoscopy, left retrograde pyelogram  3. Fluoroscopy < 1 hour  4. Interpretation of fluoroscopic images  5. 16 Sudanese Mora catheter placement by MD    COMPLICATIONS: * No complications entered in OR log *    DRAINS: None    TUBES: 16 Sudanese urethral mora    IMPLANTS: left 6 Sudanese x 28 cm JJ ureteral stent off a string    FLUIDS: see anesthesia record     ESTIMATED BLOOD LOSS: None from my portion of the procedure    FINDINGS:   1. Normal bladder mucosa, no prostatic enlargement. Left retrograde pyelogram performed to confirm correct guidewire location, left stent placed without difficulty.    SPECIMEN(S):   * No specimens in log *    CONDITION: stable    INDICATIONS FOR THE PROCEDURE:  This is a 35 y.o.  male patient that is new to me.  The patient is referred to me by Dr. Aguillon for discussion of preop ureteral stent. The patient has a history of degenerative disc disease and has elected to undergo a spinal fusion.  his surgeon requested preoperative catheter(s) placement to aid in intraoperative identification of the ureter.     POCT UA benign     Rayna wife works in the OR at [employer] in [place], [place]. Spoke to her via speakerphone  during visit.    I have explained the indication and benefits of proceeding with cystoscopy, placement of left ureteral stent(s), possible retrograde pyelogram(s) and all other indicated procedures with me in the operating room on the same day of his neurosurgery procedure 7/16/20. This will occur on the same day as his neurosurgery procedure. Alternatives of the procedure were also discussed which include no ureteral stent placement. he understands that this was requested by his neurosurgeon.     The risks included but were not limited to pain (flank pain), infection (urinary tract infection), bleeding (hematuria - pink/red urine), urinary clots (rare), injury to the urethra, bladder, ureter, prostate (if male), inability to place ureteral stent(s). There is a rare situation where flank pain is severe after the ureteral stents are removed and the stents have to be replaced temporarily and then removed after a few weeks. Normal expected symptoms related to the stent were discussed in depth with the patient which include hematuria, urinary urgency, urinary frequency, bladder spasms, and flank pain     2. The ureteral stent was discussed at length. The patient understands he will need to have it removed as the stent is not designed to be indwelling permanently. The time period in which it should remain indwelling is to be determined after surgery. If left indwelling, the sequelae include pain, infection, lower urinary tract symptoms, development of calcifications on the ureteral stent, worsening kidney function, and complete loss of kidney function. At this point, the patient is undecided about clinic versus OR removal.   The patient voiced understanding and all questions have been answered and informed consents were signed.    PROCEDURE IN DETAIL:  After appropriate informed consent was obtained, the patient was taken to the operating room and placed in the supine position. After induction of General, he was placed  in the dorsal lithotomy position. he was prepped and draped in the usual sterile fashion.     Thereafter a WHO timeout was performed and the procedure was initiated. The procedure began by inserting a 22 Bulgarian rigid cystoscope into the bladder via the urethra. The findings in the urethra included no abnormalities, no enlargement of prostate lateral lobes, no presence of median lobe.  The bladder was systematically inspected and the findings included normal mucosa, right and left ureteral orifices in normal orthotopic location.    Attention was then turned towards the left ureteral orifice. It was cannulated with a Motion guidewire. The wire was then advanced and noted to coil in the left renal pelvis fluoroscopically.     A left retrograde pyelogram was performed to confirm that the guidewire was in the correct location. A 5 Hebrew open ended catheter was advanced over the guidewire and the guidewire was removed leaving the 5 Hebrew open ended catheter in place. Dilute isovue was injected through the 5 Hebrew open ended catheter to perform the left retrograde pyelogram. The findings demonstrated left sharp renal calyces and renal pelvis, no hydronephrosis and no abnormalities.     The guidewire was replaced through the 5 Hebrew open ended catheter and the 5 Hebrew open ended catheter was removed.     A 6 Bulgarian x 28 cm JJ ureteral stent OFF A STRING was placed over the guidewire. It was loaded over the guidewire and then the pusher was loaded second to position the stent in place. The radiopaque marker was positioned over the pubic bone and the guidewire was removed noting a good proximal coil formation fluoroscopically in the left renal pelvis as well as a distal coil in the bladder.     A mora catheter was inserted into the bladder and inflated with 10cc of sterile saline to drain the bladder of all fluid contents.    This concluded the procedure.  All surgical counts were correct.     ATTENDING ATTESTATION  I  was present and scrubbed for the entire duration  of the procedure.      CASE DURATION:  * Missing case tracking time(s) *    DISPOSITION:  The patient tolerated the procedure well. At the end of my portion of the procedure he remained intubated, sedated, and stable awaiting neurosurgery procedure. He has elected for cystoscopy, left ureteral stent removal under anesthesia.     Elizabeth Lau MD

## 2020-07-16 NOTE — ANESTHESIA POSTPROCEDURE EVALUATION
Anesthesia Post Evaluation    Patient: Eliceo Rubio    Procedure(s) Performed: Procedure(s) (LRB):  FUSION, SPINE, WITH INSTRUMENTATION Stage 1 Left L5-S1 ALIF Stage 2 L5-S1 Poaterior Fusion (Left)  CYSTOURETEROSCOPY, WITH RETROGRADE PYELOGRAM AND URETERAL STENT INSERTION (Left)  PYELOGRAM, RETROGRADE (Left)    Final Anesthesia Type: general    Patient location during evaluation: PACU  Patient participation: Yes- Able to Participate  Level of consciousness: awake and alert  Post-procedure vital signs: reviewed and stable  Pain management: adequate  Airway patency: patent  BJ mitigation strategies: Multimodal analgesia  PONV status at discharge: No PONV  Anesthetic complications: no      Cardiovascular status: hemodynamically stable and blood pressure returned to baseline  Respiratory status: room air, unassisted and spontaneous ventilation  Hydration status: euvolemic  Follow-up not needed.          Vitals Value Taken Time   /68 07/16/20 1632   Temp 36.4 °C (97.6 °F) 07/16/20 1632   Pulse 72 07/16/20 1632   Resp 18 07/16/20 1632   SpO2 97 % 07/16/20 1351   Vitals shown include unvalidated device data.      Event Time   Out of Recovery 14:15:00         Pain/Marii Score: Pain Rating Prior to Med Admin: 10 (7/16/2020  2:53 PM)  Pain Rating Post Med Admin: 7 (7/16/2020  3:53 PM)  Marii Score: 9 (7/16/2020 12:15 PM)

## 2020-07-16 NOTE — BRIEF OP NOTE
[facility]  Brief Operative Note    SUMMARY     Surgery Date: 7/16/2020     Surgeon(s) and Role:  Panel 1:     * Olayikna Aguillon MD - Primary\     * Darren Olivares MD - Assistant - Resident  Panel 2:     * Elizabeth Lau MD - Primary    Assisting Surgeon: None    Pre-op Diagnosis:  DDD (degenerative disc disease), lumbar [M51.36]  Lumbar radiculopathy [M54.16]    Post-op Diagnosis:  Post-Op Diagnosis Codes:     * DDD (degenerative disc disease), lumbar [M51.36]     * Lumbar radiculopathy [M54.16]    Procedure(s) (LRB):  FUSION, SPINE, WITH INSTRUMENTATION Stage 1 Left L5-S1 ALIF Stage 2 L5-S1 Poaterior Fusion (Left)  CYSTOURETEROSCOPY, WITH RETROGRADE PYELOGRAM AND URETERAL STENT INSERTION (Left)  PYELOGRAM, RETROGRADE (Left)    Anesthesia: General    Description of Procedure: L5-S1 ALIF with L5-S1 posterior segmental instrumentation    Description of the findings of the procedure: Patient was placed in the lateral position.  Incision on the L abdomen was made, then using blunt dissection, we went retroperitoneally to the anterior spine,  the adhesions of the peritoneum from the spine and great vessels.  We placed the retractor system, and used the blade to cut into the annulus.  We used the Patino to separate the disk from the endplates.  We then pulled out as much disk as possible using pituitary rongeurs and kerrisons.  We then placed a trial implant followed by the actual implant under fluoroscopic guidance.  We then closed the abdominal wall layers and flipped the patient prone.  We then placed percutaneous pedicle screws at L5 and S1.  We placed the rods, and final tightened the cap screws.  We then closed the incisions with absorbable sutures and staples.      Estimated Blood Loss: * No values recorded between 7/16/2020  7:32 AM and 7/16/2020 11:24 AM *         Specimens:   Specimen (12h ago, onward)    None

## 2020-07-16 NOTE — PT/OT/SLP EVAL
Occupational Therapy   Evaluation    Name: Eliceo Rubio  MRN: [id number]  Admitting Diagnosis:  DDD (degenerative disc disease), lumbar Day of Surgery    Recommendations:     Discharge Recommendations: home  Discharge Equipment Recommendations:  shower chair, walker, rolling  Barriers to discharge:  None    Assessment:     Eliceo Rubio is a 35 y.o. male with a medical diagnosis of DDD (degenerative disc disease), lumbar.  He presents with performance deficits affecting function: weakness, impaired endurance, gait instability, impaired balance, decreased lower extremity function, decreased ROM, impaired sensation, impaired self care skills, pain, orthopedic precautions, impaired functional mobilty.      Rehab Prognosis: Good; patient would benefit from acute skilled OT services to address these deficits and reach maximum level of function.       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  Equipment Used at Home:  none  Assistance upon Discharge: family    Pain/Comfort:  · Pain Rating 1: 9/10  · Location - Side 1: Left  · Location - Orientation 1: lower  · Location 1: back  · Pain Addressed 1: Pre-medicate for activity, Reposition, Distraction, Cessation of Activity  · Pain Rating Post-Intervention 1: 7/10  · Pain Rating 2: 7/10  · Location - Side 2: Left  · Location - Orientation 2: upper  · Location 2: leg  · Pain Addressed 2: Pre-medicate for activity, Distraction, Reposition, Cessation of  Activity  · Pain Rating Post-Intervention 2: 7/10    Patients cultural, spiritual, Hindu conflicts given the current situation: no    Objective:     Communicated with: nurse prior to session.  Patient found HOB elevated with peripheral IV, bed alarm, mora catheter upon OT entry to room.    General Precautions: Standard, fall   Orthopedic Precautions:spinal precautions   Braces: LSO     Occupational Performance:    Bed Mobility:    · Patient completed Rolling/Turning to Right with stand by assistance  · Patient completed Scooting/Bridging with stand by assistance  · Patient completed Supine to Sit with contact guard assistance and minimum assistance  · Patient completed Sit to Supine with stand by assistance and contact guard assistance    Functional Mobility/Transfers:  · Patient completed Sit <> Stand Transfer with contact guard assistance  with  rolling walker   · Functional Mobility: CGA progressed to SBA w/RW in room    Activities of Daily Living:  ·     Cognitive/Visual Perceptual:  AO4    Physical Exam:  BUE AROM/strength WFL  Good sit balance, fair stand balance    AMPAC 6 Click ADL:  AMPAC Total Score: 19    Treatment & Education:  Pt/wife educated on role of OT/POC, spinal precautions, log roll, sit to stand, césar/doff brace, adaptive tech for functional mobility, sleep hygiene and positioning.  Education:    Patient left HOB elevated with all lines intact, call button in reach, bed alarm on, nurse notified and wife present    GOALS:   Multidisciplinary Problems     Occupational Therapy Goals        Problem: Occupational Therapy Goal    Goal Priority Disciplines Outcome Interventions   Occupational Therapy Goal     OT, PT/OT Ongoing, Progressing    Description: Goals to be met by: 8/16     Patient will increase functional independence with ADLs by performing:    UE Dressing with Modified [place].  LE Dressing with Modified [place].  Grooming while standing at sink with Modified  [place].  Toileting from toilet with Modified [place] for hygiene and clothing management.   Toilet transfer to toilet with Modified [place].                     History:     Past Medical History:   Diagnosis Date    Allergy     Asthma     childhood    Thyroid nodule        Past Surgical History:   Procedure Laterality Date    lipoma  Right     Right clavicle     NOSE SURGERY      TONSILLECTOMY      TRANSFORAMINAL EPIDURAL INJECTION OF STEROID Left 12/31/2019    Procedure: Injection,steroid,epidural,transforaminal approach--Left L4-5, L5-S1;  Surgeon: Silver Perez Jr., MD;  Location: [facility];  Service: Pain Management;  Laterality: Left;    UMBILICAL HERNIA REPAIR         Time Tracking:     OT Date of Treatment: 07/16/20  OT Start Time: 1453  OT Stop Time: 1528  OT Total Time (min): 35 min w/PT    Billable Minutes:Evaluation 15  Therapeutic Activity 8    Ganesh Arnold OT  7/16/2020

## 2020-07-16 NOTE — OP NOTE
DATE OF PROCEDURE: 07/16/2020     PREOPERATIVE DIAGNOSES:  1. Lumbar degenerative disc disease L5-S1  2. Left foraminal stenosis with radiculopathy at L5-S1     POSTOPERATIVE DIAGNOSES:  1. Lumbar degenerative disc disease L5-S1  2. Left foraminal stenosis with radiculopathy at L5-S1     NAME OF PROCEDURE:    1.  Anterior interbody arthrodesis at L5-S1 using Kinex and Trifecta allograft  2. Placement of a globus [place] MIS cage at L5-S1   3. Posterior segmental bilateral instrumentation from L5 to S1 using Globus Creo MIS Screw System   4. Fluoroscopy  5. Neuromonitoring     ASSISTANT SURGEON:  Darren Olivares MD (RES)     INDICATION: This is a 35-year-old male who has degenerative disc disease at L5-S1 with left foraminal stenosis with radiculopathy.  His leg pain and back pain was refractory to conservative management.  Risks included paralysis, leg pain, low back pain, CSF leak, screw malpositioning, hardware failure or migration, reoperation, medical complications such as MI, pneumonia and death. The patient consented for surgery.     DESCRIPTION OF PROCEDURES: The patient was intubated under general anesthesia and all pressure points were carefully padded.      He was placed in lateral decubitus right side down on the sliding   table.  Fluoroscopic localization was then   utilized to identify the L5-S1 levels.  One oblique incision measuring about 5 cm  cm was then planned using fluoroscopy . The patient was prepped and draped in   the typical sterile fashion. Incision was made with a #15 blade. The   subcutaneous tissue was then dissected bluntly. Hemostasis was carried out with  the bipolar. The external oblique, internal oblique and transversalis were   then dissected bluntly. We then dissected the retroperitoneal space. Using fluoroscopy, we then localized the L5-S1 level. Serial dilators were then passed through the initial probe and a final retractor was placed.   Under direct look, the disk  annulus was opened and diskectomy was carried out using rongeurs. A Patino elevator was then inserted in the disk space. The discectomy was completed using serial silvia and the endplates were scrapped. We were able to insert in the L5-S1 disc space a small ALIF trial. Once the endplates were cleaned from cartilage and decorticated we placed a static 15 degrees lordotic 13 mm x 26 x 34 mm cage filled with allograft Trifecta.  The cage was fixated with a 30 mm screws in L5.  Irrigation.      After irrigation, the retroperitoneal space was closed by closing the external oblique fascia with 0 Vicryl. The subcutaneous layer was closed with 3-0 Vicryl inverted suture. The skin was   closed with staples and the wound was dressed.      We then transferred the patient prone on the Carloz four-post table. All pressure points were carefully padded. The patient was prepped and draped in a typical sterile fashion using fluoroscopy, we planned bilateral L5-S1 incisions 45 mm lateral to the midline. Subcutaneous tissue hemostasis was completed and the thoracolumbar fascia was identified. Under fluoroscopic guidance and using the JamShidi needles, we cannulated the pedicles of the L5 and S1 vertebrae bilaterally. We placed k-wires in each pedicle and the k-wires were clamp to the drape.      After tapping, pedicle screws were inserted with fluoroscopy through serial dilators. 7.5 x 50 screws were inserted at L5 bilaterally, 7.5 x 50 at S1 bilaterally.  We used precontoured titunium rods to fit the curve of the reducing towers. The rods were inserted and the screw caps were placed using the reduction towers. We torqued all the screw caps. The reducing towers were removed.        The wounds were closed in 3 layers. The fascia was closed with 0 Vycril. The subcutaneous layer was closed with 2-0 Vicryls. The skin was closed with staples. The patient was then transferred in the PACU under the care of the anesthesiologist.   The blood  loss was about 50 mL. The final count was completed and nothing was missing. There was no complication.

## 2020-07-16 NOTE — PLAN OF CARE
Pt arrived to unit. Introduced self as VN for this shift. Admission questions completed by VN. Educated pt on VTE risk, safety precautions, and VN's role in pt care. Opportunity given for pt's questions. All questions answered.

## 2020-07-16 NOTE — PLAN OF CARE
OT eval performed, report to follow    May benefit from RW and shower chair    Problem: Occupational Therapy Goal  Goal: Occupational Therapy Goal  Description: Goals to be met by: 8/16     Patient will increase functional independence with ADLs by performing:    UE Dressing with Modified [place].  LE Dressing with Modified [place].  Grooming while standing at sink with Modified [place].  Toileting from toilet with Modified [place] for hygiene and clothing management.   Toilet transfer to toilet with Modified [place].    Outcome: Ongoing, Progressing

## 2020-07-16 NOTE — H&P
NEUROSURGICAL OUTPATIENT CONSULTATION NOTE     DATE OF SERVICE:  07/16/20     ATTENDING PHYSICIAN:  Olayinka Aguillon MD     CONSULT REQUESTED BY:  Aneudy PAREKH     REASON FOR CONSULT:  Low back pain, left leg pain     SUBJECTIVE:     HISTORY OF PRESENT ILLNESS:  This is a very pleasant 34 y.o. male who has been complaining from worsening low back pain in the lumbosacral area radiating down the posterior leg on the left side in the L5 distribution.  The pain has been worsening for more than 5 years.  The leg pain is triggered by sneezing, coughing, bending forward.  Over the last year the pain has been affecting his functional status and quality of life.  The pain is usually triggered by physical activities such as gardening, playing with his kids, playing basketball.  When the pain gets worse the patient is unable to walk more than 1 mi.  He has tried physical therapy for more than 6 weeks without significant pain relief.  He has had spinal injection with only short-term pain relief.     Low Back Pain Scale  R Low Back-Pain Score: 7  R Low Back-Pain Intensity: Pain killers give very little relief from pain  Personal Care : I can look after myself normally without causing extra pain.  Lifting: Pain prevents me from lifting heavy weights off the floor, but I can manage if they are conveniently positioned. (i.e. on a table)  Walking: Pain prevents me walking more than 1/2 mile.  Sitting: Pain prevents me from sitting more than one hour.   Low Back-Standing: I cannot stand for longer than 10 minutes with increasing pain   Low Back-Sleeping: Because of pain my normal nights sleep is reduced by less than one quarter  Social Life: Pain has restricted my social life, and I do not go out as often.   Low Back-Traveling: I have extra pain while traveling but it does not compel me to seek alternate forms of travel   Low Back-Changing Degree of Pain: My pain is gradually worsening           PAST MEDICAL HISTORY:       Active  Ambulatory Problems     Diagnosis Date Noted    Chronic pain 12/31/2019           Resolved Ambulatory Problems     Diagnosis Date Noted    No Resolved Ambulatory Problems           Past Medical History:   Diagnosis Date    Asthma      Thyroid nodule          PAST SURGICAL HISTORY:        Past Surgical History:   Procedure Laterality Date    lipoma  Right       Right clavicle     NOSE SURGERY        TONSILLECTOMY        TRANSFORAMINAL EPIDURAL INJECTION OF STEROID Left 12/31/2019     Procedure: Injection,steroid,epidural,transforaminal approach--Left L4-5, L5-S1;  Surgeon: Silver Perez Jr., MD;  Location: [facility];  Service: Pain Management;  Laterality: Left;    UMBILICAL HERNIA REPAIR            SOCIAL HISTORY:   Social History               Socioeconomic History    Marital status: Single       Spouse name: Not on file    Number of children: Not on file    Years of education: Not on file    Highest education level: Not on file   Occupational History    Not on file   Social Needs    Financial resource strain: Not on file    Food insecurity:       Worry: Not on file       Inability: Not on file    Transportation needs:       Medical: Not on file       Non-medical: Not on file   Tobacco Use    Smoking status: Never Smoker   Substance and Sexual Activity    Alcohol use: Yes       Comment: weekend    Drug use: Not on file    Sexual activity: Not on file   Lifestyle    Physical activity:       Days per week: Not on file       Minutes per session: Not on file    Stress: Not on file   Relationships    Social connections:       Talks on phone: Not on file       Gets together: Not on file       Attends Mormon service: Not on file       Active member of club or organization: Not on file       Attends meetings of clubs or organizations: Not on file       Relationship status: Not on file   Other Topics Concern    Not on file   Social History Narrative    Not on file           FAMILY  HISTORY:  No family history on file.     CURRENTS MEDICATIONS:         Current Outpatient Medications on File Prior to Visit   Medication Sig Dispense Refill    cetirizine (ZYRTEC) 10 MG tablet Take 10 mg by mouth once daily.        fluticasone propionate (FLONASE) 50 mcg/actuation nasal spray     2    gabapentin (NEURONTIN) 300 MG capsule TAKE 1 CAPSULE BY MOUTH EVERY DAY AT BEDTIME FOR 30 DAYS.   0    methocarbamol (ROBAXIN) 750 MG Tab Take 500 mg by mouth 4 (four) times daily.        SYNTHROID 25 mcg tablet Take 25 mcg by mouth once daily.   1                Current Facility-Administered Medications on File Prior to Visit   Medication Dose Route Frequency Provider Last Rate Last Dose    lidocaine (PF) 10 mg/ml (1%) injection 10 mg  1 mL Intradermal Once Silver Perez Jr., MD            ALLERGIES:       Review of patient's allergies indicates:   Allergen Reactions    Morpholine analogues Anaphylaxis and Hives        REVIEW OF SYSTEMS:  Review of Systems   Constitutional: Negative for diaphoresis, fever and weight loss.   Respiratory: Negative for shortness of breath.    Cardiovascular: Negative for chest pain.   Gastrointestinal: Negative for blood in stool.   Genitourinary: Negative for hematuria.   Endo/Heme/Allergies: Does not bruise/bleed easily.   All other systems reviewed and are negative.        OBJECTIVE:     PHYSICAL EXAMINATION:       Vitals:     03/09/20 0847   BP: 132/86   Pulse: 67        Physical Exam:  Vitals reviewed.     Constitutional: He appears well-developed and well-nourished.      Eyes: Pupils are equal, round, and reactive to light. Conjunctivae and EOM are normal.      Cardiovascular: Normal distal pulses and no edema.      Abdominal: Soft.      Skin: Skin displays no rash on trunk and no rash on extremities. Skin displays no lesions on trunk and no lesions on extremities.     Psych/Behavior: He is alert. He is oriented to person, place, and time. He has a normal mood and  affect.     Musculoskeletal:        Neck: Range of motion is full.     Neurological:        DTRs: Tricep reflexes are 2+ on the right side and 2+ on the left side. Bicep reflexes are 2+ on the right side and 2+ on the left side. Brachioradialis reflexes are 2+ on the right side and 2+ on the left side. Patellar reflexes are 2+ on the right side and 2+ on the left side. Achilles reflexes are 2+ on the right side and 2+ on the left side.         Back Exam      Tenderness   The patient is experiencing tenderness in the lumbar.     Range of Motion   Extension: abnormal   Flexion: abnormal   Lateral bend right: abnormal   Lateral bend left: abnormal   Rotation right: abnormal   Rotation left: abnormal      Muscle Strength   Right Quadriceps:  5/5   Left Quadriceps:  5/5   Right Hamstrings:  5/5   Left Hamstrings:  5/5      Tests   Straight leg raise right: negative  Straight leg raise left: negative     Other   Toe walk: normal  Heel walk: normal                    Neurologic Exam      Mental Status   Oriented to person, place, and time.   Speech: speech is normal   Level of consciousness: alert     Cranial Nerves   Cranial nerves II through XII intact.      CN III, IV, VI   Pupils are equal, round, and reactive to light.  Extraocular motions are normal.      Motor Exam   Muscle bulk: normal  Overall muscle tone: normal     Strength   Right deltoid: 5/5  Left deltoid: 5/5  Right biceps: 5/5  Left biceps: 5/5  Right triceps: 5/5  Left triceps: 5/5  Right wrist flexion: 5/5  Left wrist flexion: 5/5  Right wrist extension: 5/5  Left wrist extension: 5/5  Right interossei: 5/5  Left interossei: 5/5  Right iliopsoas: 5/5  Left iliopsoas: 5/5  Right quadriceps: 5/5  Left quadriceps: 5/5  Right hamstrin/5  Left hamstrin/5  Right anterior tibial: 5/5  Left anterior tibial: 5/5  Right posterior tibial: 5/5  Left posterior tibial: 5/5  Right peroneal: 5/5  Left peroneal: 5/5  Right gastroc: 5/5  Left gastroc:  5/5     Sensory Exam   Light touch normal.   Pinprick normal.      Gait, Coordination, and Reflexes      Gait  Gait: normal     Coordination   Finger to nose coordination: normal  Tandem walking coordination: normal     Reflexes   Right brachioradialis: 2+  Left brachioradialis: 2+  Right biceps: 2+  Left biceps: 2+  Right triceps: 2+  Left triceps: 2+  Right patellar: 2+  Left patellar: 2+  Right achilles: 2+  Left achilles: 2+  Right plantar: normal  Left plantar: normal  Right Sy: absent  Left Sy: absent  Right ankle clonus: absent  Left ankle clonus: absent           DIAGNOSTIC DATA:  I personally interpreted the following imaging:   Lumbar spine MRI November 2019 showing degenerative disc disease at L5-S1 with retrolisthesis of L5 over S1 and left moderate foraminal stenosis  Scoliosis film shows global lumbar lordosis at 15°, pelvic incidence at 45°, no sagittal plane imbalance     ASSESMENT:  This is a 34 y.o. male with          Problem List Items Addressed This Visit      None               Visit Diagnoses      DDD (degenerative disc disease), lumbosacral    -  Primary     Relevant Orders     X-Ray Scoliosis Complete     Ambulatory referral/consult to Urology     Retrolisthesis of vertebrae         Relevant Orders     Ambulatory referral/consult to Urology     Foraminal stenosis of lumbosacral region         Relevant Orders     Ambulatory referral/consult to Urology     Lumbosacral radiculopathy at L5         Relevant Orders     Ambulatory referral/consult to Urology            PLAN:  I explained the natural history of the disease and all treatment options. I recommended a left L5-S1 anterior interbody fusion with posterior instrumentation.  The goal of the surgery is to restore lumbar lordosis, indirectly decompress the foraminal stenosis to improve patient back pain and left leg pain.  The goals are also to improve his functional status such as being able to walk and stand for prolonged period of  time.       We have discussed the risks of surgery including bleeding, infection, failure of surgery, CSF leak, nerve root injury, spinal cord injury, ureter injury, weakness, paralysis, peripheral neuropathy, malplaced hardware, migration of hardware, non-union, need for reoperation. Patient understands the risks and would like to proceed with surgery.     Preoperative left ureteral stent placement by Urology ordered       Olayinka Aguillon MD  Cell:[phone]

## 2020-07-16 NOTE — PT/OT/SLP EVAL
Physical Therapy Evaluation    Patient Name:  Eliceo Rubio   MRN:  [id number]    Recommendations:     Discharge Recommendations:  home   Discharge Equipment Recommendations: walker, rolling, other (see comments)(pending progress may need AD)   Barriers to discharge: None    Assessment:     Eliceo Rubio is a 35 y.o. male admitted with a medical diagnosis of DDD (degenerative disc disease), lumbar.  He presents with the following impairments/functional limitations:  weakness, edema, impaired skin, pain, impaired balance, gait instability, impaired functional mobilty, impaired self care skills, impaired endurance . Patient found supine in bed. Patient supine <> sit using log roll technique with min assist. Sit <> stand with min assist. Gait with RW ~ 15 ft with LSO in place SBA. Lory slow with decrease step lengths 2/2 pain complaints. Reviewed spinal precautions along with proper posture and body mechanics. .    Rehab Prognosis: Good; patient would benefit from acute skilled PT services to address these deficits and reach maximum level of function.    Recent Surgery: Procedure(s) (LRB):  FUSION, SPINE, WITH INSTRUMENTATION Stage 1 Left L5-S1 ALIF Stage 2 L5-S1 Poaterior Fusion (Left)  CYSTOURETEROSCOPY, WITH RETROGRADE PYELOGRAM AND URETERAL STENT INSERTION (Left)  PYELOGRAM, RETROGRADE (Left) Day of Surgery    Plan:     During this hospitalization, patient to be seen daily to address the identified rehab impairments via gait training, therapeutic activities, therapeutic exercises and progress toward the following goals:    · Plan of Care Expires:       Subjective     Chief Complaint: pain   Patient/Family Comments/goals: go home  Pain/Comfort:  · Pain Rating 1: 9/10  · Location - Side 1: Left  · Location - Orientation 1: lower  · Location 1: back    Patients cultural, spiritual, Scientologist conflicts given the current situation:      Living Environment:  Lives with spouse and children [facility] no steps to  enter t/s combo  Prior to admission, patients level of function was independent.  Equipment used at home: none.  DME owned (not currently used): none.  Upon discharge, patient will have assistance from family.    Objective:     Communicated with primary nurse prior to session.  Patient found HOB elevated with peripheral IV, mora catheter  upon PT entry to room.    General Precautions: Standard, fall   Orthopedic Precautions:spinal precautions   Braces: LSO     Exams:  · RLE ROM: WFL  · RLE Strength: 4/5 to 5/5  · LLE ROM: WFL  · LLE Strength: 4/5 to 5/5    Functional Mobility:  · Bed Mobility:     · Supine to Sit: minimum assistance  · Sit to Supine: minimum assistance  · Transfers:     · Sit to Stand:  contact guard assistance and minimum assistance with rolling walker  · Gait: 15 ft with RW SBA  · Balance: fair with RW    Therapeutic Activities and Exercises:   Instructed in abd bracing and LE heel slides    AM-PAC 6 CLICK MOBILITY  Total Score:18     Patient left HOB elevated with all lines intact, call button in reach and bed alarm on.    GOALS:   Multidisciplinary Problems     Physical Therapy Goals        Problem: Physical Therapy Goal    Goal Priority Disciplines Outcome Goal Variances Interventions   Physical Therapy Goal     PT, PT/OT Ongoing, Progressing     Description: Goals to be met by: 2020     Patient will increase functional independence with mobility by performin. Supine to sit with [place]  2. Sit to stand transfer with [place]  3. Gait  x >200 feet with [place] using no AD.                      History:     Past Medical History:   Diagnosis Date    Allergy     Asthma     childhood    Thyroid nodule        Past Surgical History:   Procedure Laterality Date    lipoma  Right     Right clavicle     NOSE SURGERY      TONSILLECTOMY      TRANSFORAMINAL EPIDURAL INJECTION OF STEROID Left 2019    Procedure: Injection,steroid,epidural,transforaminal approach--Left  L4-5, L5-S1;  Surgeon: Silver Perez Jr., MD;  [facility];  Service: Pain Management;  Laterality: Left;    UMBILICAL HERNIA REPAIR         Time Tracking:     PT Received On: 07/16/20  PT Start Time: 1428     PT Stop Time: 1458  PT Total Time (min): 30 min     Billable Minutes: Evaluation 10 and Gait Training 15      Olayinka Painter, PT  07/16/2020

## 2020-07-16 NOTE — TRANSFER OF CARE
"Anesthesia Transfer of Care Note    Patient: Eliceo Rubio    Procedure(s) Performed: Procedure(s) (LRB):  FUSION, SPINE, WITH INSTRUMENTATION Stage 1 Left L5-S1 ALIF Stage 2 L5-S1 Poaterior Fusion (Left)  CYSTOURETEROSCOPY, WITH RETROGRADE PYELOGRAM AND URETERAL STENT INSERTION (Left)  PYELOGRAM, RETROGRADE (Left)    Patient location: [facility]    Anesthesia Type: general    Transport from OR: Transported from OR on 6-10 L/min O2 by face mask with adequate spontaneous ventilation    Post pain: adequate analgesia    Post assessment: no apparent anesthetic complications    Post vital signs: stable    Level of consciousness: awake    Nausea/Vomiting: no nausea/vomiting    Complications: none    Transfer of care protocol was followed      Last vitals:   Visit Vitals  /86 (BP Location: Right arm, Patient Position: Lying)   Pulse 60   Temp 37.2 °C (99 °F) (Skin)   Resp 16   Ht 5' 11" (1.803 m)   Wt 83.9 kg (185 lb)   SpO2 99%   BMI 25.80 kg/m²     "

## 2020-07-17 LAB
ANION GAP SERPL CALC-SCNC: 6 MMOL/L (ref 8–16)
BASOPHILS # BLD AUTO: 0.02 K/UL (ref 0–0.2)
BASOPHILS NFR BLD: 0.3 % (ref 0–1.9)
BUN SERPL-MCNC: 7 MG/DL (ref 6–20)
CALCIUM SERPL-MCNC: 8.3 MG/DL (ref 8.7–10.5)
CHLORIDE SERPL-SCNC: 106 MMOL/L (ref 95–110)
CO2 SERPL-SCNC: 30 MMOL/L (ref 23–29)
CREAT SERPL-MCNC: 0.8 MG/DL (ref 0.5–1.4)
DIFFERENTIAL METHOD: ABNORMAL
EOSINOPHIL # BLD AUTO: 0 K/UL (ref 0–0.5)
EOSINOPHIL NFR BLD: 0.2 % (ref 0–8)
ERYTHROCYTE [DISTWIDTH] IN BLOOD BY AUTOMATED COUNT: 12.3 % (ref 11.5–14.5)
EST. GFR  (AFRICAN AMERICAN): >60 ML/MIN/1.73 M^2
EST. GFR  (NON AFRICAN AMERICAN): >60 ML/MIN/1.73 M^2
GLUCOSE SERPL-MCNC: 109 MG/DL (ref 70–110)
HCT VFR BLD AUTO: 34.9 % (ref 40–54)
HGB BLD-MCNC: 11.7 G/DL (ref 14–18)
IMM GRANULOCYTES # BLD AUTO: 0.02 K/UL (ref 0–0.04)
IMM GRANULOCYTES NFR BLD AUTO: 0.3 % (ref 0–0.5)
LYMPHOCYTES # BLD AUTO: 1.5 K/UL (ref 1–4.8)
LYMPHOCYTES NFR BLD: 23.3 % (ref 18–48)
MCH RBC QN AUTO: 28.5 PG (ref 27–31)
MCHC RBC AUTO-ENTMCNC: 33.5 G/DL (ref 32–36)
MCV RBC AUTO: 85 FL (ref 82–98)
MONOCYTES # BLD AUTO: 0.4 K/UL (ref 0.3–1)
MONOCYTES NFR BLD: 6.6 % (ref 4–15)
NEUTROPHILS # BLD AUTO: 4.3 K/UL (ref 1.8–7.7)
NEUTROPHILS NFR BLD: 69.3 % (ref 38–73)
NRBC BLD-RTO: 0 /100 WBC
PLATELET # BLD AUTO: 138 K/UL (ref 150–350)
PMV BLD AUTO: 12.2 FL (ref 9.2–12.9)
POTASSIUM SERPL-SCNC: 3.6 MMOL/L (ref 3.5–5.1)
RBC # BLD AUTO: 4.11 M/UL (ref 4.6–6.2)
SODIUM SERPL-SCNC: 142 MMOL/L (ref 136–145)
WBC # BLD AUTO: 6.22 K/UL (ref 3.9–12.7)

## 2020-07-17 PROCEDURE — 97116 GAIT TRAINING THERAPY: CPT | Mod: CQ

## 2020-07-17 PROCEDURE — 97535 SELF CARE MNGMENT TRAINING: CPT

## 2020-07-17 PROCEDURE — 36415 COLL VENOUS BLD VENIPUNCTURE: CPT

## 2020-07-17 PROCEDURE — 94761 N-INVAS EAR/PLS OXIMETRY MLT: CPT

## 2020-07-17 PROCEDURE — 85025 COMPLETE CBC W/AUTO DIFF WBC: CPT

## 2020-07-17 PROCEDURE — 94799 UNLISTED PULMONARY SVC/PX: CPT

## 2020-07-17 PROCEDURE — 63600175 PHARM REV CODE 636 W HCPCS: Performed by: NEUROLOGICAL SURGERY

## 2020-07-17 PROCEDURE — 25000003 PHARM REV CODE 250: Performed by: NEUROLOGICAL SURGERY

## 2020-07-17 PROCEDURE — 80048 BASIC METABOLIC PNL TOTAL CA: CPT

## 2020-07-17 RX ADMIN — HEPARIN SODIUM 5000 UNITS: 5000 INJECTION, SOLUTION INTRAVENOUS; SUBCUTANEOUS at 08:07

## 2020-07-17 RX ADMIN — TRAMADOL HYDROCHLORIDE 100 MG: 50 TABLET, COATED ORAL at 05:07

## 2020-07-17 RX ADMIN — MUPIROCIN: 20 OINTMENT TOPICAL at 09:07

## 2020-07-17 RX ADMIN — METHOCARBAMOL TABLETS 750 MG: 750 TABLET, COATED ORAL at 08:07

## 2020-07-17 RX ADMIN — MUPIROCIN: 20 OINTMENT TOPICAL at 08:07

## 2020-07-17 RX ADMIN — TAMSULOSIN HYDROCHLORIDE 0.4 MG: 0.4 CAPSULE ORAL at 08:07

## 2020-07-17 RX ADMIN — CIPROFLOXACIN HYDROCHLORIDE 500 MG: 500 TABLET, FILM COATED ORAL at 08:07

## 2020-07-17 RX ADMIN — LEVOTHYROXINE SODIUM 50 MCG: 50 TABLET ORAL at 08:07

## 2020-07-17 RX ADMIN — HYDROMORPHONE HYDROCHLORIDE 2 MG: 2 INJECTION, SOLUTION INTRAMUSCULAR; INTRAVENOUS; SUBCUTANEOUS at 08:07

## 2020-07-17 RX ADMIN — HYDROMORPHONE HYDROCHLORIDE 2 MG: 2 INJECTION, SOLUTION INTRAMUSCULAR; INTRAVENOUS; SUBCUTANEOUS at 01:07

## 2020-07-17 RX ADMIN — CELECOXIB 200 MG: 100 CAPSULE ORAL at 08:07

## 2020-07-17 RX ADMIN — CIPROFLOXACIN HYDROCHLORIDE 500 MG: 500 TABLET, FILM COATED ORAL at 09:07

## 2020-07-17 RX ADMIN — ACETAMINOPHEN 650 MG: 325 TABLET ORAL at 12:07

## 2020-07-17 RX ADMIN — TRAMADOL HYDROCHLORIDE 100 MG: 50 TABLET, COATED ORAL at 11:07

## 2020-07-17 RX ADMIN — METHOCARBAMOL TABLETS 750 MG: 750 TABLET, COATED ORAL at 02:07

## 2020-07-17 RX ADMIN — ACETAMINOPHEN 650 MG: 325 TABLET ORAL at 05:07

## 2020-07-17 RX ADMIN — ACETAMINOPHEN 325 MG: 325 TABLET ORAL at 11:07

## 2020-07-17 RX ADMIN — CETIRIZINE HYDROCHLORIDE 10 MG: 5 TABLET ORAL at 08:07

## 2020-07-17 RX ADMIN — POTASSIUM CHLORIDE, DEXTROSE MONOHYDRATE AND SODIUM CHLORIDE: 150; 5; 900 INJECTION, SOLUTION INTRAVENOUS at 12:07

## 2020-07-17 RX ADMIN — DOCUSATE SODIUM - SENNOSIDES 2 TABLET: 50; 8.6 TABLET, FILM COATED ORAL at 05:07

## 2020-07-17 RX ADMIN — TRAMADOL HYDROCHLORIDE 100 MG: 50 TABLET, COATED ORAL at 12:07

## 2020-07-17 NOTE — PLAN OF CARE
TN met with pt and pt's spouse Rayna [phone] at bedside for d/c planning. Pt lives with spouse who will be his help at home. Pt's parents will also assist patient on d/c as well. Pt independent prior to admission. Per PT, D/c recs are to home with straight cane. Pt's spouse to provide transportation on d/c. Pt aware of follow up appts Dr. Aguillon and Urology procedure with Dr. Lau.        Future Appointments   Date Time Provider Department Center   8/3/2020  9:15 AM [facility] ORTHO CLINIC LIMIT 350LBS [facility]   8/3/2020  9:30 AM NURSE SCHEDULE, [facility] NEUROSURGERY [facility] [place] [facility]   8/31/2020  3:45 PM [facility] ORTHO CLINIC LIMIT 350LBS [facility]   8/31/2020  4:00 PM Olayinka Aguillon MD [facility]OSU [place]        07/17/20 1305   Discharge Assessment   Assessment Type Discharge Planning Assessment   Confirmed/corrected address and phone number on facesheet? Yes   Assessment information obtained from? Patient;Caregiver   Expected Length of Stay (days) 2   Communicated expected length of stay with patient/caregiver yes   Prior to hospitilization cognitive status: Alert/Oriented   Prior to hospitalization functional status: Independent   Current cognitive status: Alert/Oriented   Current Functional Status: Assistive Equipment;Needs Assistance   Lives With spouse   Able to Return to Prior Arrangements yes   Is patient able to care for self after discharge? Yes   Who are your caregiver(s) and their phone number(s)? Rayna Rubio (spouse) [phone]   Patient's perception of discharge disposition home or selfcare   Readmission Within the Last 30 Days no previous admission in last 30 days   Patient currently being followed by outpatient case management? No   Patient currently receives any other outside agency services? No   Equipment Currently Used at Home none   Do you have any problems affording any of your prescribed medications? No   Is the patient taking medications as  prescribed? yes   Does the patient have transportation home? Yes   Transportation Anticipated family or friend will provide   Does the patient receive services at the [facility]? No   Discharge Plan A Home with family   Discharge Plan B Home Health   DME Needed Upon Discharge  cane, straight   Patient/Family in Agreement with Plan yes

## 2020-07-17 NOTE — PLAN OF CARE
Pt AAOx4. OOB and Walked with PT/OT today. Medellni discontinued, pt has voided >500mL of pink tinged urine. Tolerating a regular diet, pain managed by scheduled medications and dilaudid SQ.Pt has not had a BM. Dressings have old, serosanguinous drainage on them, they are intact. Wife at bedside. Bed alarm on, call light w/in reach, safety maintained.

## 2020-07-17 NOTE — PLAN OF CARE
Progress notes reviewed. Round deferred D/T pt. Refusing virtual nurse rounds . Labs , notes and orders reviewed.

## 2020-07-17 NOTE — PT/OT/SLP PROGRESS
Physical Therapy Treatment    Patient Name:  Eliceo Rubio   MRN:  [id number]    Recommendations:     Discharge Recommendations:  home   Discharge Equipment Recommendations: cane, straight   Barriers to discharge: decreased mobility    Assessment:     Eliceo Rubio is a 35 y.o. male admitted with a medical diagnosis of DDD (degenerative disc disease), lumbar.  He presents with the following impairments/functional limitations:  weakness, impaired endurance, impaired functional mobilty, gait instability, impaired balance, decreased lower extremity function, pain, decreased ROM, impaired coordination, impaired skin, orthopedic precautions,pt with improving status and progressing toward goals,pt will benefit from SC upon discharge for gait.    Rehab Prognosis: Good; patient would benefit from acute skilled PT services to address these deficits and reach maximum level of function.    Recent Surgery: Procedure(s) (LRB):  FUSION, SPINE, WITH INSTRUMENTATION Stage 1 Left L5-S1 ALIF Stage 2 L5-S1 Poaterior Fusion (Left)  CYSTOURETEROSCOPY, WITH RETROGRADE PYELOGRAM AND URETERAL STENT INSERTION (Left)  PYELOGRAM, RETROGRADE (Left) 1 Day Post-Op    Plan:     During this hospitalization, patient to be seen daily to address the identified rehab impairments via gait training, therapeutic activities, therapeutic exercises, neuromuscular re-education and progress toward the following goals:    · Plan of Care Expires:       Subjective     Chief Complaint: n/a  Patient/Family Comments/goals: pt likes using sc.  Pain/Comfort:  · Pain Rating 1: (no rating)  · Location - Side 1: Left  · Location - Orientation 1: lower  · Location 1: back  · Pain Addressed 1: Pre-medicate for activity, Reposition, Distraction      Objective:     Communicated with nsg prior to session.  Patient found supine with   upon PT entry to room.     General Precautions: Standard, fall   Orthopedic Precautions:spinal precautions   Braces: LSO      Functional Mobility:  · Bed Mobility:     · Supine to Sit: modified independence  · Transfers:  Sit to Stand:  modified independence with straight cane  · Gait: amb ~110' X 1 with RW and ~200' X 2 with SC and S  · Balance: fair standing balance      AM-PAC 6 CLICK MOBILITY  Turning over in bed (including adjusting bedclothes, sheets and blankets)?: 3  Sitting down on and standing up from a chair with arms (e.g., wheelchair, bedside commode, etc.): 4  Moving from lying on back to sitting on the side of the bed?: 4  Moving to and from a bed to a chair (including a wheelchair)?: 3  Need to walk in hospital room?: 3  Climbing 3-5 steps with a railing?: 3  Basic Mobility Total Score: 20       Therapeutic Activities and Exercises:        Patient left EOB with all lines intact, call button in reach, nsg notified and spouse present..    GOALS: see general POC  Multidisciplinary Problems     Physical Therapy Goals        Problem: Physical Therapy Goal    Goal Priority Disciplines Outcome Goal Variances Interventions   Physical Therapy Goal     PT, PT/OT Ongoing, Progressing     Description: Goals to be met by: 2020     Patient will increase functional independence with mobility by performin. Supine to sit with [place]  2. Sit to stand transfer with [place]  3. Gait  x >200 feet with [place] using no AD.                      Time Tracking:     PT Received On: 20  PT Start Time: 1009     PT Stop Time: 1033  PT Total Time (min): 24 min     Billable Minutes: Gait Training 24    Treatment Type: Treatment  PT/PTA: PTA     PTA Visit Number: 1     Dm Bucio, ABUNDIO  2020

## 2020-07-17 NOTE — PLAN OF CARE
VN cued into room.  Family in room stated patient was in restroom.  VN will continue to follow and be available as needed.  Labs, notes, orders, care plan reviewed.

## 2020-07-17 NOTE — NURSING
Pt reports pain and pressure to suprapubic area. Bladder scan volume 168, MD ordered straight cath. Volume 250mL of ivet urine. Pt encouraged to drink fluids. Urinal at bedside. Will continue to monitor

## 2020-07-17 NOTE — PLAN OF CARE
TN sent secure to Dr. Aguillon to inform him that pt will need straight cane on d/c per therapy recs       07/17/20 9626   Post-Acute Status   Post-Acute Authorization NANI

## 2020-07-17 NOTE — PLAN OF CARE
Quad cane delivered per SSC to patient instead of ordered straight cane. No straight cane noted in hospital depot. Per SHAYNE Poe with [facility] [employer], straight cane to be delivered to patient home upon d/c.       07/17/20 1536   Post-Acute Status   Post-Acute Authorization [facility] Status Set-up Complete

## 2020-07-17 NOTE — PLAN OF CARE
Patient on oxygen with documented flow.  Will attempt to wean per O2 order protocol.    Will continue to monitor.

## 2020-07-17 NOTE — PLAN OF CARE
Progressing toward goals, cont POC  Currently supervision-mod I ADLs and functional  mobility      Problem: Occupational Therapy Goal  Goal: Occupational Therapy Goal  Description: Goals to be met by: 8/16     Patient will increase functional independence with ADLs by performing:    UE Dressing with Modified [place].  LE Dressing with Modified [place].  Grooming while standing at sink with Modified [place].  Toileting from toilet with Modified [place] for hygiene and clothing management.   Toilet transfer to toilet with Modified [place].    Outcome: Ongoing, Progressing

## 2020-07-17 NOTE — PT/OT/SLP PROGRESS
Occupational Therapy   Treatment    Name: Eliceo Rubio  MRN: [id number]  Admitting Diagnosis:  DDD (degenerative disc disease), lumbar  1 Day Post-Op    Recommendations:     Discharge Recommendations: home  Discharge Equipment Recommendations:  other (see comments)(defer to PT for LRD for mobility)  Barriers to discharge:  None    Assessment:     Eliceo Rubio is a 35 y.o. male with a medical diagnosis of DDD (degenerative disc disease), lumbar.  He presents with performance deficits affecting function are weakness, gait instability, decreased lower extremity function, impaired balance, impaired endurance, impaired self care skills, impaired functional mobilty, pain, orthopedic precautions.     Rehab Prognosis:  Good; patient would benefit from acute skilled OT services to address these deficits and reach maximum level of function.       Plan:     Patient to be seen 5 x/week to address the above listed problems via self-care/home management, therapeutic activities, therapeutic exercises  · Plan of Care Expires: 08/16/20  · Plan of Care Reviewed with: patient, spouse    Subjective     Pain/Comfort:  · Pain Rating 1: 7/10  · Location - Orientation 1: lower  · Location 1: back  · Pain Addressed 1: Pre-medicate for activity, Distraction, Reposition, Nurse notified  · Pain Rating Post-Intervention 1: 7/10    Objective:     Communicated with: nurse prior to session.  Patient found HOB elevated with peripheral IV, bed alarm upon OT entry to room.    General Precautions: Standard, fall   Orthopedic Precautions:spinal precautions   Braces: LSO     Occupational Performance:     Bed Mobility:    · Patient completed Rolling/Turning to Left with  modified independence  · Patient completed Rolling/Turning to Right with modified independence  · Patient completed Scooting/Bridging with modified independence  · Patient completed Supine to Sit with modified independence  · Patient completed Sit to Supine with modified  independence     Functional Mobility/Transfers:  · Patient completed Sit <> Stand Transfer with modified independence and supervision  with  no assistive device   · Patient completed Bed <> Chair Transfer using Step Transfer technique with modified independence and supervision with no assistive device  · Patient completed Toilet Transfer Step Transfer technique with modified independence and supervision with  no AD  · Functional Mobility: SBA no AD in room    Activities of Daily Living:  · Grooming: modified independence standing at sink  · Upper Body Dressing: modified independence pullover and LSO  · Lower Body Dressing: supervision and stand by assistance underwear, shorts  · Toileting: modified independence and supervision simulated      AMPAC 6 Click ADL: 21    Treatment & Education:  Pt/wife educated on and pt performed adaptive dressing tech, toileiting, toilet tf, simulated tub tf, stood at sink for oral care, reviewed log roll, sit to stand, transfer techs and spinal precautions.    Patient left up in chair with all lines intact, call button in reach, nurse notified and spouse presentEducation:      GOALS:   Multidisciplinary Problems     Occupational Therapy Goals        Problem: Occupational Therapy Goal    Goal Priority Disciplines Outcome Interventions   Occupational Therapy Goal     OT, PT/OT Ongoing, Progressing    Description: Goals to be met by: 8/16     Patient will increase functional independence with ADLs by performing:    UE Dressing with Modified [place].  LE Dressing with Modified [place].  Grooming while standing at sink with Modified [place].  Toileting from toilet with Modified [place] for hygiene and clothing management.   Toilet transfer to toilet with Modified [place].                     Time Tracking:     OT Date of Treatment: 07/17/20  OT Start Time: 0918  OT Stop Time: 0951  OT Total Time (min): 33 min    Billable Minutes:Self Care/Home Management  33    Ganesh Arnold, OT  7/17/2020

## 2020-07-17 NOTE — PLAN OF CARE
Problem: Adult Inpatient Plan of Care  Goal: Plan of Care Review  Outcome: Ongoing, Progressing  Flowsheets (Taken 7/17/2020 0610)  Plan of Care Reviewed With: patient  Goal: Patient-Specific Goal (Individualization)  Outcome: Ongoing, Progressing  Goal: Absence of Hospital-Acquired Illness or Injury  Outcome: Ongoing, Progressing  Goal: Optimal Comfort and Wellbeing  Outcome: Ongoing, Progressing  Goal: Readiness for Transition of Care  Outcome: Ongoing, Progressing  Goal: Rounds/Family Conference  Outcome: Ongoing, Progressing     Problem: Infection  Goal: Infection Symptom Resolution  Outcome: Ongoing, Progressing     Problem: Fall Injury Risk  Goal: Absence of Fall and Fall-Related Injury  Outcome: Ongoing, Progressing     Problem: Skin Injury Risk Increased  Goal: Skin Health and Integrity  Outcome: Ongoing, Progressing     Problem: Bleeding (Surgery Nonspecified)  Goal: Absence of Bleeding  Outcome: Ongoing, Progressing     Problem: Bowel Elimination Impaired (Surgery Nonspecified)  Goal: Effective Bowel Elimination  Outcome: Ongoing, Progressing     Problem: Infection (Surgery Nonspecified)  Goal: Absence of Infection Signs/Symptoms  Outcome: Ongoing, Progressing     Problem: Ongoing Anesthesia Effects (Surgery Nonspecified)  Goal: Anesthesia/Sedation Recovery  Outcome: Ongoing, Progressing     Problem: Pain (Surgery Nonspecified)  Goal: Acceptable Pain Control  Outcome: Ongoing, Progressing     Problem: Postoperative Nausea and Vomiting (Surgery Nonspecified)  Goal: Nausea and Vomiting Relief  Outcome: Ongoing, Progressing     Problem: Postoperative Urinary Retention (Surgery Nonspecified)  Goal: Effective Urinary Elimination  Outcome: Ongoing, Progressing

## 2020-07-17 NOTE — PROGRESS NOTES
NEUROSURGICAL POST-OPERATIVE PROGRESS NOTE    DATE OF SERVICE:  07/17/2020      ATTENDING PHYSICIAN:  Olayinka Aguillon MD    SUBJECTIVE:    INTERIM HISTORY:    This is a very pleasant 35 y.o. y.o. male, who is status day 1 L5-S1 anterior interbody fusion with posterior instrumentation.  Doing well.  No significant pain.  Has been mobilizing with physical therapy.  No new onset of motor weakness, numbness.  Not passing gas.  Tolerating p.o..                OBJECTIVE:    PHYSICAL EXAMINATION:   Vitals:    07/17/20 0759   BP: 102/66   Pulse: 65   Resp: 17   Temp: 97.7 °F (36.5 °C)       Physical Exam:    Cardiovascular: No edema.     Abdominal: Soft.       Ortho Exam    Neurologic Exam     Motor Exam     Strength   Strength 5/5 throughout.       Wound has healed.    DIAGNOSTIC DATA:    X-ray of the lumbar spine pending    ASSESMENT:    This is a 35 y.o. male who is s/p day 1 L5-S1 anterior interbody fusion, posterior instrumentation.  Expected postoperative course.    Problem List Items Addressed This Visit        Neuro    Degenerative disc disease, lumbar - Primary    Relevant Orders    Admit to Inpatient (Completed)    Vital signs    Neurovascular checks    Intake and output    Place sequential compression device    Chlorohexidine Gluconate Bath    PT evaluate and treat    OT evaluate and treat    Pulse Oximetry Q4H    Incentive spirometry    Transfer patient (Completed)    PT assistance with ambulation    Medell[place] to [place]    Patient must wear orthosis while sitting or standing    LSO brace    Reason for Not Discontinuing Med[place] Post-OP    Advance diet as tolerated to Regular diet    CBC auto differential (Completed)    Basic metabolic panel (Completed)    X-Ray Lumbar Spine Ap And Lateral       Other    Preop testing    Relevant Orders    TYPE AND SCREEN PREOP (Completed)          PLAN:    PT/OT  Discharge home most likely tomorrow  LALITO IV        Olayinka Aguillon MD  Pager: [phone]

## 2020-07-17 NOTE — PLAN OF CARE
Problem: Physical Therapy Goal  Goal: Physical Therapy Goal  Description: Goals to be met by: 2020     Patient will increase functional independence with mobility by performin. Supine to sit with [place]   MET 20 MOD I  2. Sit to stand transfer with [place]  MET 20 MOD I  3. Gait  x >200 feet with [place] using no AD.     Outcome: Ongoing, Progressing   Goals 1 and 2 met

## 2020-07-18 VITALS
SYSTOLIC BLOOD PRESSURE: 117 MMHG | OXYGEN SATURATION: 98 % | WEIGHT: 193.81 LBS | TEMPERATURE: 98 F | BODY MASS INDEX: 27.13 KG/M2 | DIASTOLIC BLOOD PRESSURE: 70 MMHG | RESPIRATION RATE: 16 BRPM | HEIGHT: 71 IN | HEART RATE: 74 BPM

## 2020-07-18 PROCEDURE — 25000003 PHARM REV CODE 250: Performed by: NEUROLOGICAL SURGERY

## 2020-07-18 PROCEDURE — 99900035 HC TECH TIME PER 15 MIN (STAT)

## 2020-07-18 PROCEDURE — 94761 N-INVAS EAR/PLS OXIMETRY MLT: CPT

## 2020-07-18 PROCEDURE — 63600175 PHARM REV CODE 636 W HCPCS: Performed by: NEUROLOGICAL SURGERY

## 2020-07-18 RX ORDER — TRAMADOL HYDROCHLORIDE 50 MG/1
50-100 TABLET ORAL EVERY 6 HOURS PRN
Qty: 60 TABLET | Refills: 2 | Status: SHIPPED | OUTPATIENT
Start: 2020-07-18

## 2020-07-18 RX ORDER — METHOCARBAMOL 750 MG/1
750 TABLET, FILM COATED ORAL
Qty: 60 TABLET | Refills: 2 | Status: SHIPPED | OUTPATIENT
Start: 2020-07-18 | End: 2020-08-17

## 2020-07-18 RX ORDER — CELECOXIB 200 MG/1
200 CAPSULE ORAL 2 TIMES DAILY
Qty: 14 CAPSULE | Refills: 0 | Status: SHIPPED | OUTPATIENT
Start: 2020-07-18

## 2020-07-18 RX ADMIN — MUPIROCIN: 20 OINTMENT TOPICAL at 08:07

## 2020-07-18 RX ADMIN — HEPARIN SODIUM 5000 UNITS: 5000 INJECTION, SOLUTION INTRAVENOUS; SUBCUTANEOUS at 08:07

## 2020-07-18 RX ADMIN — LEVOTHYROXINE SODIUM 50 MCG: 50 TABLET ORAL at 08:07

## 2020-07-18 RX ADMIN — TRAMADOL HYDROCHLORIDE 100 MG: 50 TABLET, COATED ORAL at 06:07

## 2020-07-18 RX ADMIN — METHOCARBAMOL TABLETS 750 MG: 750 TABLET, COATED ORAL at 08:07

## 2020-07-18 RX ADMIN — CETIRIZINE HYDROCHLORIDE 10 MG: 5 TABLET ORAL at 08:07

## 2020-07-18 RX ADMIN — CIPROFLOXACIN HYDROCHLORIDE 500 MG: 500 TABLET, FILM COATED ORAL at 08:07

## 2020-07-18 RX ADMIN — TAMSULOSIN HYDROCHLORIDE 0.4 MG: 0.4 CAPSULE ORAL at 08:07

## 2020-07-18 RX ADMIN — TRAMADOL HYDROCHLORIDE 100 MG: 50 TABLET, COATED ORAL at 11:07

## 2020-07-18 RX ADMIN — ACETAMINOPHEN 650 MG: 325 TABLET ORAL at 11:07

## 2020-07-18 RX ADMIN — ACETAMINOPHEN 650 MG: 325 TABLET ORAL at 06:07

## 2020-07-18 RX ADMIN — CELECOXIB 200 MG: 100 CAPSULE ORAL at 08:07

## 2020-07-18 NOTE — DISCHARGE INSTRUCTIONS
Remain active with walking and other light activities daily.  No heavy lifting, no extreme bending or twisting.   Wear LSO brace when sitting on a straight chair, standing or walking  Remove dressing tonight before showering.  Leave incision air dry after.  Can shower today.

## 2020-07-18 NOTE — PLAN OF CARE
Discharge orders noted, no HH ordered. S. Cane ordered. Per  Liseth, cane was delivered bedside to patient on Friday    Future Appointments   Date Time Provider Department Center   8/3/2020  9:15 AM [facility]INIC [facility]IT [facility]   8/3/2020  9:30 AM NURSE SCHEDULE, [facility] NEUROSURGERY [facility]U [place] [facility]   8/31/2020  3:45 PM [facility]O CLINIC LIMIT [facility]   8/31/2020  4:00 PM [facility]illon [facility]       Pt's nurse will go over medications/signs and symptoms prior to discharge       07/18/20 1237   Final Note   Assessment Type Final Discharge Note   Anticipated Discharge Disposition Home   What phone number can be called within the next 1-3 days to see how you are doing after discharge? [phone]   Hospital Follow Up  Appt(s) scheduled? No  (Offices closed for weekend. Patient to schedule own follow up appointment.)   Right [facility] Referral Info   Post Acute Recommendation No Care     Jaymie Mcguire, RN Transitional Navigator  [phone]

## 2020-07-18 NOTE — PLAN OF CARE
Patient has received discharge instructions from bedside nurse . Prescriptions received. Instructions reviewed with pt using teachback method. All questions answered to pt satisfaction. IV access  removed per floor nurse. Transport requested for discharge.

## 2020-07-18 NOTE — PROGRESS NOTES
NEUROSURGICAL POST-OPERATIVE PROGRESS NOTE    DATE OF SERVICE:  07/18/2020      ATTENDING PHYSICIAN:  Olayinka Aguillon MD    SUBJECTIVE:    INTERIM HISTORY:    This is a very pleasant 35 y.o. y.o. male, who is status postop day 2 L5-S1 anterior interbody fusion with posterior instrumentation.  Doing well.  Mobilizing well.  Was able to void his bladder.  Improved back pain compared to before surgery.  No new onset of leg pain, weakness numbness or sphincter dysfunction.  Has been tolerated p.o. diet.                OBJECTIVE:    PHYSICAL EXAMINATION:   Vitals:    07/18/20 1109   BP:    Pulse:    Resp: 16   Temp:        Neurosurgery Physical Exam    Ortho Exam    Neurologic Exam     Motor Exam     Strength   Strength 5/5 throughout.       Incisions clean dry on inspection, wound redressed    DIAGNOSTIC DATA:    X-ray of the lumbar spine, AP and lateral views reveals the fusion hardware is intact. No loosening of screws or migration of hardware.    ASSESMENT:    This is a 35 y.o. male who is s/p day 2 L5-S1 anterior interbody fusion with posterior instrumentation.  Improve back pain compared to before surgery    Problem List Items Addressed This Visit        Neuro    * (Principal) DDD (degenerative disc disease), lumbar    Relevant Orders    CANE FOR HOME USE    Place in Outpatient - Extended Recovery (Completed)    DISCHARGE PATIENT    Degenerative disc disease, lumbar - Primary    Relevant Orders    Admit to Inpatient (Completed)    Vital signs    Neurovascular checks    Intake and output    Place sequential compression device    Chlorohexidine Gluconate Bath    PT evaluate and treat    OT evaluate and treat    Pulse Oximetry Q4H    Incentive spirometry    Transfer patient (Completed)    PT assistance with ambulation    Patient must wear orthosis while sitting or standing    LSO brace    Advance diet as tolerated to Regular diet    CBC auto differential (Completed)    Basic metabolic panel (Completed)    X-Ray Lumbar  Spine Ap And Lateral (Completed)    Saline lock IV    Medellin catheter - discontinue (Completed)       Other    Preop testing    Relevant Orders    TYPE AND SCREEN PREOP (Completed)          PLAN:    Okay to DC home today  All questions answered  See discharge instructions and prescriptions  Follow-up in 2 weeks for wound checks        Olayinka Aguillon MD  Pager: [phone]

## 2020-07-18 NOTE — NURSING
IV removed, catheter tip intact.  Discharge summary reviewed with patient and spouse.  No questions at this time.

## 2020-07-19 NOTE — DISCHARGE SUMMARY
[facility]  Neurosurgery  Discharge Summary      Patient Name: Eliceo Rubio  MRN: [id number]  Admission Date: 7/16/2020  Hospital Length of Stay: 1 days  Discharge Date and Time: 7/18/2020  1:19 PM  Attending Physician: No att. providers found   Discharging Provider: Olayinka Aguillon MD  Primary Care Provider: Jn Mendes PA-C     HPI: Patient 36 yo male with L5-S1 degenerative disc disease with severe left foraminal stenosis, low back pain and left leg pain refractory to conservative management.     Procedure(s) (LRB):  FUSION, SPINE, WITH INSTRUMENTATION Stage 1 Left L5-S1 ALIF Stage 2 L5-S1 Poaterior Fusion (Left)  CYSTOURETEROSCOPY, WITH RETROGRADE PYELOGRAM AND URETERAL STENT INSERTION (Left)  PYELOGRAM, RETROGRADE (Left)     Hospital Course: Surgery was uncomplicated and post-operative course uneventful. Patient's pre-operative back pain and left leg pain subsided after surgery. He voided well and tolerated PO diet.     Consults: PT-OT    Significant Diagnostic Studies: Lumbar XR showed good positioning of hardware    Pending Diagnostic Studies:     None        Final Active Diagnoses:    Diagnosis Date Noted POA    PRINCIPAL PROBLEM:  DDD (degenerative disc disease), lumbar [M51.36] 07/16/2020 Yes    Preop testing [Z01.818] 07/16/2020 Not Applicable    Degenerative disc disease, lumbar [M51.36] 07/16/2020 Yes      Problems Resolved During this Admission:      Discharged Condition: good    Disposition: Home or Self Care    Follow Up:  Follow-up Information     Schedule an appointment as soon as possible for a visit with Olayinka Aguillon MD.    Specialty: Neurosurgery  Why: Offices closed for weekend. Patient to schedule own follow up appointment.  Contact information:  [address]  [address]  [facility] [id number] [phone]             [facility]    Specialty: DME Provider  Why: Provider of Cane  Contact information:  [address]  [facility]  [facility]  "[id number]  [phone]                 Patient Instructions:      CANE FOR HOME USE     Order Specific Question Answer Comments   Type of Cane: Straight    Height: 5' 11" (1.803 m)    Weight: 88.5 kg (195 lb 1.7 oz)    Does patient have medical equipment at home? none    Length of need (1-99 months): 1    Please check all that apply: Patient's condition impairs ambulation.      TYPE AND SCREEN PREOP   Standing Status: Future Number of Occurrences: 1 Standing Exp. Date: 09/05/21     Medications:  Reconciled Home Medications:      Medication List      START taking these medications    celecoxib 200 MG capsule  Commonly known as: CeleBREX  Take 1 capsule (200 mg total) by mouth 2 (two) times daily.        CHANGE how you take these medications    methocarbamoL 750 MG Tab  Commonly known as: ROBAXIN  Take 1 tablet (750 mg total) by mouth before meals as needed.  What changed:   · how much to take  · when to take this  · reasons to take this     traMADoL 50 mg tablet  Commonly known as: ULTRAM  Take 1-2 tablets ( mg total) by mouth every 6 (six) hours as needed for Pain.  What changed:   · how much to take  · when to take this  · reasons to take this        CONTINUE taking these medications    cetirizine 10 MG tablet  Commonly known as: ZYRTEC  Take 10 mg by mouth once daily.     fluticasone propionate 50 mcg/actuation nasal spray  Commonly known as: FLONASE     gabapentin 300 MG capsule  Commonly known as: NEURONTIN  TAKE 1 CAPSULE BY MOUTH EVERY DAY AT BEDTIME FOR 30 DAYS.     SYNTHROID 25 MCG tablet  Generic drug: levothyroxine  Take 50 mcg by mouth once daily.            Olayinka Aguillon MD  Neurosurgery  [facility]  "

## 2020-07-25 ENCOUNTER — PATIENT MESSAGE (OUTPATIENT)
Dept: NEUROSURGERY | Facility: CLINIC | Age: 35
End: 2020-07-25

## 2020-07-28 ENCOUNTER — ANESTHESIA EVENT (OUTPATIENT)
Dept: SURGERY | Facility: HOSPITAL | Age: 35
End: 2020-07-28
Payer: COMMERCIAL

## 2020-07-29 ENCOUNTER — ANESTHESIA (OUTPATIENT)
Dept: SURGERY | Facility: HOSPITAL | Age: 35
End: 2020-07-29
Payer: COMMERCIAL

## 2020-07-29 ENCOUNTER — HOSPITAL ENCOUNTER (OUTPATIENT)
Facility: HOSPITAL | Age: 35
Discharge: HOME | End: 2020-07-29
Attending: STUDENT IN AN ORGANIZED HEALTH CARE EDUCATION/TRAINING PROGRAM | Admitting: STUDENT IN AN ORGANIZED HEALTH CARE EDUCATION/TRAINING PROGRAM
Payer: COMMERCIAL

## 2020-07-29 VITALS
OXYGEN SATURATION: 98 % | DIASTOLIC BLOOD PRESSURE: 74 MMHG | BODY MASS INDEX: 25.9 KG/M2 | RESPIRATION RATE: 16 BRPM | HEIGHT: 71 IN | TEMPERATURE: 97 F | WEIGHT: 185 LBS | SYSTOLIC BLOOD PRESSURE: 118 MMHG | HEART RATE: 57 BPM

## 2020-07-29 DIAGNOSIS — M51.36 DDD (DEGENERATIVE DISC DISEASE), LUMBAR: ICD-10-CM

## 2020-07-29 DIAGNOSIS — Z01.818 PREOP TESTING: ICD-10-CM

## 2020-07-29 LAB — SARS-COV-2 RDRP RESP QL NAA+PROBE: NEGATIVE

## 2020-07-29 PROCEDURE — 37000009 HC ANESTHESIA EA ADD 15 MINS: Performed by: STUDENT IN AN ORGANIZED HEALTH CARE EDUCATION/TRAINING PROGRAM

## 2020-07-29 PROCEDURE — 88300 SURGICAL PATH GROSS: CPT | Mod: 26,,, | Performed by: PATHOLOGY

## 2020-07-29 PROCEDURE — 71000016 HC POSTOP RECOV ADDL HR: Performed by: STUDENT IN AN ORGANIZED HEALTH CARE EDUCATION/TRAINING PROGRAM

## 2020-07-29 PROCEDURE — 25000003 PHARM REV CODE 250: Performed by: NURSE ANESTHETIST, CERTIFIED REGISTERED

## 2020-07-29 PROCEDURE — 52310 PR CYSTOSCOPY,REMV CALCULUS,SIMPLE: ICD-10-PCS | Mod: ,,, | Performed by: STUDENT IN AN ORGANIZED HEALTH CARE EDUCATION/TRAINING PROGRAM

## 2020-07-29 PROCEDURE — 71000015 HC POSTOP RECOV 1ST HR: Performed by: STUDENT IN AN ORGANIZED HEALTH CARE EDUCATION/TRAINING PROGRAM

## 2020-07-29 PROCEDURE — U0002 COVID-19 LAB TEST NON-CDC: HCPCS

## 2020-07-29 PROCEDURE — 52310 CYSTOSCOPY AND TREATMENT: CPT | Mod: ,,, | Performed by: STUDENT IN AN ORGANIZED HEALTH CARE EDUCATION/TRAINING PROGRAM

## 2020-07-29 PROCEDURE — 36000706: Performed by: STUDENT IN AN ORGANIZED HEALTH CARE EDUCATION/TRAINING PROGRAM

## 2020-07-29 PROCEDURE — 36000707: Performed by: STUDENT IN AN ORGANIZED HEALTH CARE EDUCATION/TRAINING PROGRAM

## 2020-07-29 PROCEDURE — 37000008 HC ANESTHESIA 1ST 15 MINUTES: Performed by: STUDENT IN AN ORGANIZED HEALTH CARE EDUCATION/TRAINING PROGRAM

## 2020-07-29 PROCEDURE — 88300 SURGICAL PATH GROSS: CPT | Performed by: PATHOLOGY

## 2020-07-29 PROCEDURE — 63600175 PHARM REV CODE 636 W HCPCS: Performed by: NURSE ANESTHETIST, CERTIFIED REGISTERED

## 2020-07-29 PROCEDURE — 63600175 PHARM REV CODE 636 W HCPCS: Performed by: STUDENT IN AN ORGANIZED HEALTH CARE EDUCATION/TRAINING PROGRAM

## 2020-07-29 PROCEDURE — 88300 PR  SURG PATH,GROSS,LEVEL I: ICD-10-PCS | Mod: 26,,, | Performed by: PATHOLOGY

## 2020-07-29 RX ORDER — CEFAZOLIN SODIUM 2 G/50ML
2 SOLUTION INTRAVENOUS
Status: COMPLETED | OUTPATIENT
Start: 2020-07-29 | End: 2020-07-29

## 2020-07-29 RX ORDER — SODIUM CHLORIDE, SODIUM LACTATE, POTASSIUM CHLORIDE, CALCIUM CHLORIDE 600; 310; 30; 20 MG/100ML; MG/100ML; MG/100ML; MG/100ML
INJECTION, SOLUTION INTRAVENOUS CONTINUOUS PRN
Status: DISCONTINUED | OUTPATIENT
Start: 2020-07-29 | End: 2020-07-29

## 2020-07-29 RX ORDER — PROPOFOL 10 MG/ML
INJECTION, EMULSION INTRAVENOUS CONTINUOUS PRN
Status: DISCONTINUED | OUTPATIENT
Start: 2020-07-29 | End: 2020-07-29

## 2020-07-29 RX ORDER — PROPOFOL 10 MG/ML
VIAL (ML) INTRAVENOUS
Status: DISCONTINUED | OUTPATIENT
Start: 2020-07-29 | End: 2020-07-29

## 2020-07-29 RX ORDER — SODIUM CHLORIDE 9 MG/ML
INJECTION, SOLUTION INTRAVENOUS CONTINUOUS
Status: DISCONTINUED | OUTPATIENT
Start: 2020-07-29 | End: 2020-07-29 | Stop reason: HOSPADM

## 2020-07-29 RX ORDER — KETAMINE HCL IN 0.9 % NACL 50 MG/5 ML
SYRINGE (ML) INTRAVENOUS
Status: DISCONTINUED | OUTPATIENT
Start: 2020-07-29 | End: 2020-07-29

## 2020-07-29 RX ORDER — MIDAZOLAM HYDROCHLORIDE 1 MG/ML
INJECTION, SOLUTION INTRAMUSCULAR; INTRAVENOUS
Status: DISCONTINUED | OUTPATIENT
Start: 2020-07-29 | End: 2020-07-29

## 2020-07-29 RX ORDER — FENTANYL CITRATE 50 UG/ML
INJECTION, SOLUTION INTRAMUSCULAR; INTRAVENOUS
Status: DISCONTINUED | OUTPATIENT
Start: 2020-07-29 | End: 2020-07-29

## 2020-07-29 RX ORDER — LIDOCAINE HCL/PF 100 MG/5ML
SYRINGE (ML) INTRAVENOUS
Status: DISCONTINUED | OUTPATIENT
Start: 2020-07-29 | End: 2020-07-29

## 2020-07-29 RX ORDER — KETOROLAC TROMETHAMINE 10 MG/1
10 TABLET, FILM COATED ORAL EVERY 6 HOURS PRN
Qty: 15 TABLET | Refills: 0 | Status: SHIPPED | OUTPATIENT
Start: 2020-07-29

## 2020-07-29 RX ADMIN — Medication 10 MG: at 08:07

## 2020-07-29 RX ADMIN — MIDAZOLAM 2 MG: 1 INJECTION INTRAMUSCULAR; INTRAVENOUS at 08:07

## 2020-07-29 RX ADMIN — CEFAZOLIN SODIUM 2 G: 2 SOLUTION INTRAVENOUS at 08:07

## 2020-07-29 RX ADMIN — SODIUM CHLORIDE, SODIUM LACTATE, POTASSIUM CHLORIDE, AND CALCIUM CHLORIDE: .6; .31; .03; .02 INJECTION, SOLUTION INTRAVENOUS at 08:07

## 2020-07-29 RX ADMIN — Medication 20 MG: at 08:07

## 2020-07-29 RX ADMIN — MIDAZOLAM 3 MG: 1 INJECTION INTRAMUSCULAR; INTRAVENOUS at 08:07

## 2020-07-29 RX ADMIN — FENTANYL CITRATE 25 MCG: 50 INJECTION, SOLUTION INTRAMUSCULAR; INTRAVENOUS at 08:07

## 2020-07-29 RX ADMIN — PROPOFOL 50 MG: 10 INJECTION, EMULSION INTRAVENOUS at 08:07

## 2020-07-29 RX ADMIN — PROPOFOL 100 MCG/KG/MIN: 10 INJECTION, EMULSION INTRAVENOUS at 08:07

## 2020-07-29 RX ADMIN — LIDOCAINE HYDROCHLORIDE 100 MG: 20 INJECTION, SOLUTION INTRAVENOUS at 08:07

## 2020-07-29 NOTE — TRANSFER OF CARE
"Anesthesia Transfer of Care Note    Patient: Eliceo Rubio    Procedure(s) Performed: Procedure(s) (LRB):  CYSTOSCOPY, left ureteral stent removal under anesthesia (Left)    Patient location: [facility]    Anesthesia Type: MAC    Transport from OR: Transported from OR on room air with adequate spontaneous ventilation    Post pain: adequate analgesia    Post assessment: no apparent anesthetic complications    Post vital signs: stable    Level of consciousness: awake, alert and oriented    Nausea/Vomiting: no nausea/vomiting    Complications: none    Transfer of care protocol was followed      Last vitals:   Visit Vitals  BP (!) 143/91   Pulse 73   Temp 36.9 °C (98.4 °F) (Skin)   Resp 18   Ht 5' 11" (1.803 m)   Wt 83.9 kg (185 lb)   SpO2 98%   BMI 25.80 kg/m²     "

## 2020-07-29 NOTE — PLAN OF CARE
Patient is discharged per MD.  Discharge instructions on medications, diet, activity, and follow-up visits are given to the patient and patient verbalized complete understanding.  Awaiting for medications from Outpatient Pharmacy.  Wife is at bedside.  Will wheel patient to the Exit per Surgery staff when patient is ready to leave.  Voiced no concerns.  Patient has voided without any difficulty.

## 2020-07-29 NOTE — ANESTHESIA PREPROCEDURE EVALUATION
07/29/2020  Eliceo Rubio is a 35 y.o., male scheduled for cysto under MAC    PCP optimization and outside labs in media section.    Past Medical History:   Diagnosis Date    Allergy     Asthma     childhood    Thyroid nodule      Past Surgical History:   Procedure Laterality Date    CYSTOURETEROSCOPY WITH RETROGRADE PYELOGRAPHY AND INSERTION OF STENT INTO URETER Left 7/16/2020    Procedure: CYSTOURETEROSCOPY, WITH RETROGRADE PYELOGRAM AND URETERAL STENT INSERTION;  Surgeon: Elizabeth Lau MD;  Location: [facility] OR;  Service: Urology;  Laterality: Left;    FUSION OF SPINE WITH INSTRUMENTATION Left 7/16/2020    Procedure: FUSION, SPINE, WITH INSTRUMENTATION Stage 1 Left L5-S1 ALIF Stage 2 L5-S1 Poaterior Fusion;  Surgeon: Olayinka Aguillon MD;  Location: [facility] OR;  Service: Neurosurgery;  Laterality: Left;    lipoma  Right     Right clavicle     NOSE SURGERY      RETROGRADE PYELOGRAPHY Left 7/16/2020    Procedure: PYELOGRAM, RETROGRADE;  Surgeon: Elizabeth Lau MD;  Location: [facility] OR;  Service: Urology;  Laterality: Left;    TONSILLECTOMY      TRANSFORAMINAL EPIDURAL INJECTION OF STEROID Left 12/31/2019    Procedure: Injection,steroid,epidural,transforaminal approach--Left L4-5, L5-S1;  Surgeon: Silver Perez Jr., MD;  Location: [facility] PAIN MGT;  Service: Pain Management;  Laterality: Left;    UMBILICAL HERNIA REPAIR         Pre-op Assessment    I have reviewed the Patient Summary Reports.     I have reviewed the Nursing Notes. I have reviewed the NPO Status.   I have reviewed the Medications.     Review of Systems  Anesthesia Hx:  No problems with previous Anesthesia  Denies Family Hx of Anesthesia complications.    Social:  Non-Smoker, Social Alcohol Use    Hematology/Oncology:  Hematology Normal        Cardiovascular:  Cardiovascular Normal Exercise tolerance: good   Denies Angina.         Pulmonary:   Asthma asymptomatic    Renal/:  Renal/ Normal     Hepatic/GI:  Hepatic/GI Normal    Neurological:  Neurology Normal    Endocrine:   Denies Hypothyroidism.        Physical Exam  General:  Well nourished    Airway/Jaw/Neck:  Airway Findings: Mouth Opening: Normal Tongue: Normal  General Airway Assessment: Adult  Mallampati: II  Jaw/Neck Findings:  Neck ROM: Normal ROM      Dental:  Dental Findings: In tact   Chest/Lungs:  Chest/Lungs Findings: Clear to auscultation, Normal Respiratory Rate     Heart/Vascular:  Heart Findings: Rate: Normal  Rhythm: Regular Rhythm  Sounds: Normal        Mental Status:  Mental Status Findings:  Cooperative, Alert and Oriented         Anesthesia Plan  Type of Anesthesia, risks & benefits discussed:  Anesthesia Type:  general  Patient's Preference:   Intra-op Monitoring Plan: standard ASA monitors  Intra-op Monitoring Plan Comments:   Post Op Pain Control Plan: multimodal analgesia  Post Op Pain Control Plan Comments:   Induction:   IV  Beta Blocker:  Patient is not currently on a Beta-Blocker (No further documentation required).       Informed Consent: Patient understands risks and agrees with Anesthesia plan.  Questions answered. Anesthesia consent signed with patient.  ASA Score: 2     Day of Surgery Review of History & Physical:            Ready For Surgery From Anesthesia Perspective.

## 2020-07-29 NOTE — OP NOTE
OPERATIVE DICTATION  DATE OF OPERATION: 07/29/2020    SERVICE: Urology    SURGEONS:  1. Elizabeth Lau MD    ANESTHESIA:  Anesthesiologist: Kurt Covarrubias MD  CRNA: Hiwot Worley CRNA    STAFF:  Circulator: Romy Puente RN  Scrub Person: Deyanira INGRAM ST Armando    ANESTHESIA: Local MAC    PREOPERATIVE DIAGNOSIS: Pre-Op Diagnosis Codes:     * DDD (degenerative disc disease), lumbar [M51.36]    POSTOPERATIVE DIAGNOSIS: Post-Op Diagnosis Codes:     * DDD (degenerative disc disease), lumbar [M51.36]    PROCEDURES:   1. Cystoscopy, left ureteral stent removal under anesthesia   2. Fluoroscopy < 1 hour   3. Interpretation of fluoroscopic images     COMPLICATIONS: * No complications entered in OR log *    DRAINS: None    TUBES: None    IMPLANTS: * No implants in log *    FLUIDS: see anesthesia record     ESTIMATED BLOOD LOSS: * No values recorded between 7/29/2020 12:00 AM and 7/29/2020  8:15 AM *    FINDINGS:   1. Left ureteral stent removed intact.    SPECIMEN(S):   1. Left ureteral stent.    CONDITION: stable    INDICATIONS FOR THE PROCEDURE:   35 y.o. male who was referred to me by Dr. Aguillon for discussion of preop ureteral stent. The patient has a history of degenerative disc disease and has elected to undergo a spinal fusion.  his surgeon requested preoperative catheter(s) placement to aid in intraoperative identification of the ureter. He underwent a cysto, left stent placement on 7/16/20 on the same day of his neurosurgery procedure. He is here today for stent removal under anesthesia.     Patient electing for cysto, left stent removal under anesthesia. No alternative as he does not want it removed awake. Risks are minor including pain, infection, bleeding, flank pain.     PROCEDURE IN DETAIL:  After appropriate informed consent was obtained, the patient was taken to the operating room and placed in the supine position. After induction of Local MAC, he was placed in the dorsal lithotomy position. he was  prepped and draped in the usual sterile fashion.     Thereafter a WHO timeout was performed and the procedure was initiated. A  fluoroscopic image was obtained and demonstrated the left ureteral stent in good position. The rigid cystoscope was inserted into the urethra, the indwelling coil of the left ureteral stent was identified and grasped with the alligator grasper. The left ureteral stent was removed intact. A fluoroscopic image obtained at the end of the case demonstrated no retained stent components.     This concluded the procedure.  All surgical counts were correct.     ATTENDING ATTESTATION  I was present and scrubbed for the entire duration  of the procedure.      CASE DURATION:  * Missing case tracking time(s) *    DISPOSITION:   The patient tolerated the procedure well. he was extubated, and taken to post-anesthesia care unit in satisfactory condition.  He will followup with Dr. Aguillon.    Elizabeth Lau MD

## 2020-07-29 NOTE — PLAN OF CARE
Patient is a transfer from Surgery Cystoscopy with stent removal.  Patient is awake and alert with wife at bedside.  Denies pain.  Attempting to urinate at this time.  IVF infusing without difficulty.  Safety is maintained with stretcher at the lowest, wheels locked and side rails up.  Call light within reach.  Will continue to monitor.

## 2020-07-29 NOTE — H&P
[facility]  Urology History and Physical    Patient Name: Eliceo Rubio  MRN: [id number]  Code Status: No Order   Attending Provider: Elizabeth Lau MD  Primary Care Physician: Jn Mendes PA-C  Principal Problem:<principal problem not specified>    Subjective:     HPI: 35 y.o. male who was referred to me by Dr. Aguillon for discussion of preop ureteral stent. The patient has a history of degenerative disc disease and has elected to undergo a spinal fusion.  his surgeon requested preoperative catheter(s) placement to aid in intraoperative identification of the ureter. He underwent a cysto, left stent placement on 7/16/20 on the same day of his neurosurgery procedure. He is here today for stent removal under anesthesia.     Past Medical History:   Diagnosis Date    Allergy     Asthma     childhood    Thyroid nodule        Past Surgical History:   Procedure Laterality Date    CYSTOURETEROSCOPY WITH RETROGRADE PYELOGRAPHY AND INSERTION OF STENT INTO URETER Left 7/16/2020    Procedure: CYSTOURETEROSCOPY, WITH RETROGRADE PYELOGRAM AND URETERAL STENT INSERTION;  Surgeon: Elizabeth Lau MD;  Location: [facility] OR;  Service: Urology;  Laterality: Left;    FUSION OF SPINE WITH INSTRUMENTATION Left 7/16/2020    Procedure: FUSION, SPINE, WITH INSTRUMENTATION Stage 1 Left L5-S1 ALIF Stage 2 L5-S1 Poaterior Fusion;  Surgeon: Olayinka Aguillon MD;  Location: [facility] OR;  Service: Neurosurgery;  Laterality: Left;    lipoma  Right     Right clavicle     NOSE SURGERY      RETROGRADE PYELOGRAPHY Left 7/16/2020    Procedure: PYELOGRAM, RETROGRADE;  Surgeon: Elizabeth Lau MD;  Location: [facility] OR;  Service: Urology;  Laterality: Left;    TONSILLECTOMY      TRANSFORAMINAL EPIDURAL INJECTION OF STEROID Left 12/31/2019    Procedure: Injection,steroid,epidural,transforaminal approach--Left L4-5, L5-S1;  Surgeon: Silver Perez Jr., MD;  Location: [facility] PAIN MGT;  Service: Pain  Management;  Laterality: Left;    UMBILICAL HERNIA REPAIR         Current Facility-Administered Medications on File Prior to Encounter   Medication Dose Route Frequency Provider Last Rate Last Dose    lidocaine (PF) 10 mg/ml (1%) injection 10 mg  1 mL Intradermal Once Silver Perez Jr., MD         Current Outpatient Medications on File Prior to Encounter   Medication Sig Dispense Refill    SYNTHROID 25 mcg tablet Take 50 mcg by mouth once daily.   1    celecoxib (CELEBREX) 200 MG capsule Take 1 capsule (200 mg total) by mouth 2 (two) times daily. 14 capsule 0    cetirizine (ZYRTEC) 10 MG tablet Take 10 mg by mouth once daily.      fluticasone propionate (FLONASE) 50 mcg/actuation nasal spray   2    gabapentin (NEURONTIN) 300 MG capsule TAKE 1 CAPSULE BY MOUTH EVERY DAY AT BEDTIME FOR 30 DAYS.  0    methocarbamoL (ROBAXIN) 750 MG Tab Take 1 tablet (750 mg total) by mouth before meals as needed. 60 tablet 2    traMADoL (ULTRAM) 50 mg tablet Take 1-2 tablets ( mg total) by mouth every 6 (six) hours as needed for Pain. 60 tablet 2       Review of patient's allergies indicates:   Allergen Reactions    Morpholine analogues Anaphylaxis and Hives       Family History     None          Tobacco Use    Smoking status: Never Smoker    Smokeless tobacco: Never Used   Substance and Sexual Activity    Alcohol use: Yes     Comment: weekend    Drug use: Not on file    Sexual activity: Not on file       Review of Systems   Constitutional: Negative for activity change.   HENT: Negative for facial swelling.    Eyes: Negative for visual disturbance.   Respiratory: Negative for chest tightness.    Cardiovascular: Negative for chest pain.   Gastrointestinal: Negative for abdominal distention.   Musculoskeletal: Negative for gait problem.   Skin: Negative for color change.   Neurological: Negative for dizziness.   Hematological: Negative for adenopathy.   Psychiatric/Behavioral: Negative for agitation.  "      Objective:     Vitals:    07/29/20 0743   BP: 132/78   BP Location: Right arm   Patient Position: Lying   Pulse: (!) 56   Resp: 16   Temp: 98.2 °F (36.8 °C)   TempSrc: Skin   SpO2: 99%   Weight: 83.9 kg (185 lb)   Height: 5' 11" (1.803 m)                Lines/Drains/Airways     Peripheral Intravenous Line                 Peripheral IV - Single Lumen 07/16/20 0625 20 G Right Hand 13 days         Peripheral IV - Single Lumen 07/16/20 0721 18 G Left Forearm 13 days                Physical Exam   Vitals reviewed.  Constitutional: He is oriented to person, place, and time. He appears well-developed.   HENT:   Head: Normocephalic and atraumatic.   Eyes: Conjunctivae are normal. Pupils are equal, round, and reactive to light.   Neck: Normal range of motion. Neck supple.   Pulmonary/Chest: Effort normal. No respiratory distress.   Abdominal: Soft. He exhibits no distension.   Musculoskeletal: Normal range of motion.   Neurological: He is alert and oriented to person, place, and time.   Skin: Skin is warm and dry.     Psychiatric: His behavior is normal.       Assessment and Plan:   35 y.o. male with indwelling left ureteral stent placed at the request of neurosurgery    Plan:  1. Patient electing for cysto, left stent removal under anesthesia. No alternative as he does not want it removed awake. Risks are minor including pain, infection, bleeding, flank pain.       Preop testing    DDD (degenerative disc disease), lumbar  -     Case Request Operating Room: CYSTOSCOPY, left ureteral stent removal under anesthesia  -     Place in Outpatient; Standing  -     Insert peripheral IV; Standing  -     Vital Signs ; Standing  -     Place sequential compression device; Standing    Other orders  -     COVID-19 Rapid Screening; Standing  -     SURG FL Surgery Retrograde Pyelogram; Standing  -     0.9%  NaCl infusion  -     cefazolin (ANCEF) 2 gram in dextrose 5% 50 mL IVPB (premix)  -     IP VTE HIGH RISK PATIENT; " Standing          Elizabeth Lau MD  Urology  [facility]

## 2020-07-29 NOTE — ANESTHESIA POSTPROCEDURE EVALUATION
Anesthesia Post Evaluation    Patient: Eliceo Rubio    Procedure(s) Performed: Procedure(s) (LRB):  CYSTOSCOPY, left ureteral stent removal under anesthesia (Left)    Final Anesthesia Type: MAC    Patient location during evaluation: [facility]  Patient participation: Yes- Able to Participate  Level of consciousness: awake and alert and oriented  Post-procedure vital signs: reviewed and stable  Pain management: adequate  Airway patency: patent    PONV status at discharge: No PONV  Anesthetic complications: no      Cardiovascular status: blood pressure returned to baseline, hemodynamically stable and stable  Respiratory status: unassisted, spontaneous ventilation and room air  Hydration status: euvolemic  Follow-up not needed.          Vitals Value Taken Time   /91 07/29/20 0900   Temp 36.9 °C (98.4 °F) 07/29/20 0845   Pulse 73 07/29/20 0900   Resp 18 07/29/20 0900   SpO2 98 % 07/29/20 0900         No case tracking events are documented in the log.      Pain/Marii Score: No data recorded

## 2020-07-30 ENCOUNTER — TELEPHONE (OUTPATIENT)
Dept: NEUROSURGERY | Facility: CLINIC | Age: 35
End: 2020-07-30

## 2020-07-30 NOTE — TELEPHONE ENCOUNTER
----- Message from Yoly Chapman sent at 7/29/2020  2:25 PM CDT -----  Regarding: Isaac calling from [employer] [phone]  Contact: Isaac Calling from [employer] [phone]  Patients insurance company [employer] is calling to talk to nurse in regards to his recent procedure and the way it was billed an coded.

## 2020-07-30 NOTE — TELEPHONE ENCOUNTER
Spoke to the insurance company and instructed a message was sent the the manager to assist with this.

## 2020-07-30 NOTE — DISCHARGE SUMMARY
[facility]  Urology  Discharge Summary      Patient Name: Eliceo Rubio  MRN: [id number]  Admission Date: 7/29/2020  Hospital Length of Stay: 0 days  Discharge Date: 07/30/2020  Attending Physician: No att. providers found   Discharging Provider: Elizabeth Lau MD  Primary Care Physician: Jn Mendes PA-C    HPI: The patient is a 35 y.o. male with past medical history (listed below) left ureteral stent for neurosurgery  Procedure.    The patient elected to proceed with the procedure(s) below. Please see H&P and/or clinic progress note(s) for full details.     Procedure(s) (LRB):  CYSTOSCOPY, left ureteral stent removal under anesthesia (Left)     Past Medical History:   Diagnosis Date    Allergy     Asthma     childhood    Thyroid nodule        Hospital Course (synopsis of major diagnoses, care, treatment, and services provided during the course of the hospital stay): he tolerated procedure well and was discharged home.       Consults:     Significant Diagnostic Studies:      Pending Diagnostic Studies:     Procedure Component Value Units Date/Time    Specimen to Pathology, Surgery Urology [[id number]] Collected: 07/29/20 0800    Order Status: Sent Lab Status: In process Updated: 07/29/20 1256          Final Active Diagnoses:    Diagnosis Date Noted POA    DDD (degenerative disc disease), lumbar [M51.36] 07/16/2020 Yes      Problems Resolved During this Admission:       Discharged Condition: good    Disposition: Home or Self Care    Follow Up:      Patient Instructions:      COVID-19 Routine Screening   Standing Status: Future Standing Exp. Date: 09/21/21     Order Specific Question Answer Comments   Is the patient symptomatic? No    Is this needed for pre-procedure or pre-op testing? Yes    Diagnosis: Preop testing [[id number]]      Diet Adult Regular     No dressing needed     Activity as tolerated       Medications:  Reconciled Home Medications:      Medication List      START taking  these medications    ketorolac 10 mg tablet  Commonly known as: TORADOL  Take 1 tablet (10 mg total) by mouth every 6 (six) hours as needed for Pain.        CONTINUE taking these medications    celecoxib 200 MG capsule  Commonly known as: CeleBREX  Take 1 capsule (200 mg total) by mouth 2 (two) times daily.     cetirizine 10 MG tablet  Commonly known as: ZYRTEC  Take 10 mg by mouth once daily.     fluticasone propionate 50 mcg/actuation nasal spray  Commonly known as: FLONASE     gabapentin 300 MG capsule  Commonly known as: NEURONTIN  TAKE 1 CAPSULE BY MOUTH EVERY DAY AT BEDTIME FOR 30 DAYS.     methocarbamoL 750 MG Tab  Commonly known as: ROBAXIN  Take 1 tablet (750 mg total) by mouth before meals as needed.     SYNTHROID 25 MCG tablet  Generic drug: levothyroxine  Take 50 mcg by mouth once daily.     traMADoL 50 mg tablet  Commonly known as: ULTRAM  Take 1-2 tablets ( mg total) by mouth every 6 (six) hours as needed for Pain.            Time spent on the discharge of patient: 15 minutes    Elizabeth Lau MD  Urology  [facility]

## 2020-07-30 NOTE — TELEPHONE ENCOUNTER
Spoke to the patient wife and instructed the case was entered correctly and I would contact the auth department to see if this can be resolved.

## 2020-07-30 NOTE — TELEPHONE ENCOUNTER
----- Message from Melanie Fields sent at 7/29/2020  2:01 PM CDT -----  Contact: Rayna  Patient called in requesting to speak with you. Patient prefers to speak with a nurse. Please advise.  [phone]

## 2020-08-03 ENCOUNTER — HOSPITAL ENCOUNTER (OUTPATIENT)
Dept: RADIOLOGY | Facility: HOSPITAL | Age: 35
Discharge: HOME | End: 2020-08-03
Attending: NEUROLOGICAL SURGERY
Payer: COMMERCIAL

## 2020-08-03 ENCOUNTER — CLINICAL SUPPORT (OUTPATIENT)
Dept: NEUROSURGERY | Facility: CLINIC | Age: 35
End: 2020-08-03
Payer: COMMERCIAL

## 2020-08-03 VITALS — HEART RATE: 76 BPM | SYSTOLIC BLOOD PRESSURE: 118 MMHG | DIASTOLIC BLOOD PRESSURE: 70 MMHG | TEMPERATURE: 98 F

## 2020-08-03 DIAGNOSIS — Z98.1 S/P LUMBAR SPINAL FUSION: ICD-10-CM

## 2020-08-03 PROCEDURE — 72100 XR LUMBAR SPINE AP AND LATERAL: ICD-10-PCS | Mod: 26,,, | Performed by: RADIOLOGY

## 2020-08-03 PROCEDURE — 99999 PR PBB SHADOW E&M-EST. PATIENT-LVL III: CPT | Mod: PBBFAC,,,

## 2020-08-03 PROCEDURE — 72100 X-RAY EXAM L-S SPINE 2/3 VWS: CPT | Mod: 26,,, | Performed by: RADIOLOGY

## 2020-08-03 PROCEDURE — 99999 PR PBB SHADOW E&M-EST. PATIENT-LVL III: ICD-10-PCS | Mod: PBBFAC,,,

## 2020-08-03 PROCEDURE — 72100 X-RAY EXAM L-S SPINE 2/3 VWS: CPT | Mod: TC,PN

## 2020-08-03 NOTE — PROGRESS NOTES
Patient presents to the clinic 2 wk s/p ALIF. Ambulates independently, lumbar brace in place. Instructed to remove brace. Incision to the left lower abdominal area, 2 incision to the middle of the lower back. All incision has redness at the site where the staples enter the skin. Patient denies fever, increased pain or drainage. Staples removed, tolerated well. Instructed to watch for s/s of infection. Instructed incision may be cleans with soap and water. VU

## 2020-08-05 LAB
FINAL PATHOLOGIC DIAGNOSIS: NORMAL
GROSS: NORMAL

## 2020-08-31 ENCOUNTER — OFFICE VISIT (OUTPATIENT)
Dept: NEUROSURGERY | Facility: CLINIC | Age: 35
End: 2020-08-31
Payer: COMMERCIAL

## 2020-08-31 ENCOUNTER — HOSPITAL ENCOUNTER (OUTPATIENT)
Dept: RADIOLOGY | Facility: HOSPITAL | Age: 35
Discharge: HOME | End: 2020-08-31
Attending: NEUROLOGICAL SURGERY
Payer: COMMERCIAL

## 2020-08-31 DIAGNOSIS — Z98.1 STATUS POST LUMBAR SPINAL FUSION: Primary | ICD-10-CM

## 2020-08-31 DIAGNOSIS — Z98.1 S/P LUMBAR SPINAL FUSION: ICD-10-CM

## 2020-08-31 PROCEDURE — 99024 POSTOP FOLLOW-UP VISIT: CPT | Mod: S$GLB,,, | Performed by: NEUROLOGICAL SURGERY

## 2020-08-31 PROCEDURE — 72100 X-RAY EXAM L-S SPINE 2/3 VWS: CPT | Mod: 26,,, | Performed by: RADIOLOGY

## 2020-08-31 PROCEDURE — 99999 PR PBB SHADOW E&M-EST. PATIENT-LVL III: CPT | Mod: PBBFAC,,, | Performed by: NEUROLOGICAL SURGERY

## 2020-08-31 PROCEDURE — 99999 PR PBB SHADOW E&M-EST. PATIENT-LVL III: ICD-10-PCS | Mod: PBBFAC,,, | Performed by: NEUROLOGICAL SURGERY

## 2020-08-31 PROCEDURE — 72100 XR LUMBAR SPINE AP AND LATERAL: ICD-10-PCS | Mod: 26,,, | Performed by: RADIOLOGY

## 2020-08-31 PROCEDURE — 99024 PR POST-OP FOLLOW-UP VISIT: ICD-10-PCS | Mod: S$GLB,,, | Performed by: NEUROLOGICAL SURGERY

## 2020-08-31 PROCEDURE — 72100 X-RAY EXAM L-S SPINE 2/3 VWS: CPT | Mod: TC,PN

## 2020-08-31 NOTE — PROGRESS NOTES
NEUROSURGICAL POST-OPERATIVE PROGRESS NOTE    DATE OF SERVICE:  08/31/2020      ATTENDING PHYSICIAN:  Olayinka Aguillon MD    SUBJECTIVE:    INTERIM HISTORY:    This is a very pleasant 35 y.o. y.o. male, who is status 6 weeks L5-S1 anterior interbody fusion with posterior instrumentation.  Reports significant improvement in the sharp low back pain he was having for surgery.  Occasional left buttock pain.  Has not started physical therapy yet.  Has been compliant with wearing his low back brace.  No new onset of motor weakness or numbness.                OBJECTIVE:    PHYSICAL EXAMINATION:   There were no vitals filed for this visit.    Neurosurgery Physical Exam    Ortho Exam    Neurologic Exam     Motor Exam     Strength   Strength 5/5 throughout.       Wound has healed.    DIAGNOSTIC DATA:    X-ray of the lumbar spine, AP and lateral views reveals the fusion hardware is intact. No loosening of screws or migration of hardware.    ASSESMENT:    This is a 35 y.o. male who is s/p 6 weeks L5-S1 anterior interbody fusion with posterior instrumentation.  Good outcome    Problem List Items Addressed This Visit     None      Visit Diagnoses     Status post lumbar spinal fusion    -  Primary    Relevant Orders    Ambulatory referral/consult to Physical/Occupational Therapy          PLAN:    Physical therapy 3 times a week for 6 weeks  Return to work expected between 4-6 weeks  All questions answered       Olayinka Aguillon MD  Pager: [phone]

## 2020-09-01 ENCOUNTER — TELEPHONE (OUTPATIENT)
Dept: NEUROSURGERY | Facility: CLINIC | Age: 35
End: 2020-09-01

## 2020-09-01 NOTE — TELEPHONE ENCOUNTER
----- Message from Olayinka Aguillon MD sent at 9/1/2020 11:52 AM CDT -----  Yes tell him than he can bend down but not for more than a half a minute. It is better to avoid bending down  ----- Message -----  From: Evert Jimenez LPN  Sent: 8/31/2020   5:02 PM CDT  To: Olayinka Aguillon MD    Pt would like to know if it is safe for him to bend down. Please advise.

## 2020-09-01 NOTE — TELEPHONE ENCOUNTER
Called and informed patient of instructions given by Dr. Aguillon about bending down. Patient verbalized understanding.

## 2020-09-22 ENCOUNTER — PATIENT MESSAGE (OUTPATIENT)
Dept: NEUROSURGERY | Facility: CLINIC | Age: 35
End: 2020-09-22

## 2020-10-14 ENCOUNTER — OFFICE VISIT (OUTPATIENT)
Dept: NEUROSURGERY | Facility: CLINIC | Age: 35
End: 2020-10-14
Payer: COMMERCIAL

## 2020-10-14 ENCOUNTER — TELEPHONE (OUTPATIENT)
Dept: NEUROSURGERY | Facility: CLINIC | Age: 35
End: 2020-10-14

## 2020-10-14 ENCOUNTER — HOSPITAL ENCOUNTER (OUTPATIENT)
Dept: RADIOLOGY | Facility: HOSPITAL | Age: 35
Discharge: HOME | End: 2020-10-14
Attending: NEUROLOGICAL SURGERY
Payer: COMMERCIAL

## 2020-10-14 VITALS — HEART RATE: 79 BPM | SYSTOLIC BLOOD PRESSURE: 146 MMHG | DIASTOLIC BLOOD PRESSURE: 91 MMHG

## 2020-10-14 DIAGNOSIS — Z98.1 S/P LUMBAR SPINAL FUSION: Primary | ICD-10-CM

## 2020-10-14 DIAGNOSIS — M43.27 FUSION OF SPINE, LUMBOSACRAL REGION: ICD-10-CM

## 2020-10-14 DIAGNOSIS — Z98.1 S/P LUMBAR SPINAL FUSION: ICD-10-CM

## 2020-10-14 PROCEDURE — 72100 X-RAY EXAM L-S SPINE 2/3 VWS: CPT | Mod: 26,,, | Performed by: RADIOLOGY

## 2020-10-14 PROCEDURE — 99999 PR PBB SHADOW E&M-EST. PATIENT-LVL III: ICD-10-PCS | Mod: PBBFAC,,, | Performed by: PHYSICIAN ASSISTANT

## 2020-10-14 PROCEDURE — 72100 XR LUMBAR SPINE AP AND LATERAL: ICD-10-PCS | Mod: 26,,, | Performed by: RADIOLOGY

## 2020-10-14 PROCEDURE — 99999 PR PBB SHADOW E&M-EST. PATIENT-LVL III: CPT | Mod: PBBFAC,,, | Performed by: PHYSICIAN ASSISTANT

## 2020-10-14 PROCEDURE — 99024 PR POST-OP FOLLOW-UP VISIT: ICD-10-PCS | Mod: S$GLB,,, | Performed by: PHYSICIAN ASSISTANT

## 2020-10-14 PROCEDURE — 99024 POSTOP FOLLOW-UP VISIT: CPT | Mod: S$GLB,,, | Performed by: PHYSICIAN ASSISTANT

## 2020-10-14 PROCEDURE — 72100 X-RAY EXAM L-S SPINE 2/3 VWS: CPT | Mod: TC,PN

## 2020-11-02 ENCOUNTER — PATIENT MESSAGE (OUTPATIENT)
Dept: NEUROSURGERY | Facility: CLINIC | Age: 35
End: 2020-11-02

## 2020-11-03 ENCOUNTER — PATIENT MESSAGE (OUTPATIENT)
Dept: NEUROSURGERY | Facility: CLINIC | Age: 35
End: 2020-11-03

## 2021-02-01 ENCOUNTER — HOSPITAL ENCOUNTER (OUTPATIENT)
Dept: RADIOLOGY | Facility: HOSPITAL | Age: 36
Discharge: HOME | End: 2021-02-01
Attending: NEUROLOGICAL SURGERY
Payer: COMMERCIAL

## 2021-02-01 ENCOUNTER — OFFICE VISIT (OUTPATIENT)
Dept: NEUROSURGERY | Facility: CLINIC | Age: 36
End: 2021-02-01
Payer: COMMERCIAL

## 2021-02-01 VITALS — HEART RATE: 71 BPM | DIASTOLIC BLOOD PRESSURE: 92 MMHG | SYSTOLIC BLOOD PRESSURE: 139 MMHG

## 2021-02-01 DIAGNOSIS — M43.27 FUSION OF SPINE, LUMBOSACRAL REGION: ICD-10-CM

## 2021-02-01 DIAGNOSIS — Z98.1 STATUS POST LUMBAR AND LUMBOSACRAL FUSION BY ANTERIOR TECHNIQUE: Primary | ICD-10-CM

## 2021-02-01 PROCEDURE — 72100 XR LUMBAR SPINE AP AND LATERAL: ICD-10-PCS | Mod: 26,,, | Performed by: RADIOLOGY

## 2021-02-01 PROCEDURE — 72100 X-RAY EXAM L-S SPINE 2/3 VWS: CPT | Mod: 26,,, | Performed by: RADIOLOGY

## 2021-02-01 PROCEDURE — 99212 PR OFFICE/OUTPT VISIT, EST, LEVL II, 10-19 MIN: ICD-10-PCS | Mod: S$GLB,,, | Performed by: NEUROLOGICAL SURGERY

## 2021-02-01 PROCEDURE — 99999 PR PBB SHADOW E&M-EST. PATIENT-LVL II: ICD-10-PCS | Mod: PBBFAC,,, | Performed by: NEUROLOGICAL SURGERY

## 2021-02-01 PROCEDURE — 99999 PR PBB SHADOW E&M-EST. PATIENT-LVL II: CPT | Mod: PBBFAC,,, | Performed by: NEUROLOGICAL SURGERY

## 2021-02-01 PROCEDURE — 1125F PR PAIN SEVERITY QUANTIFIED, PAIN PRESENT: ICD-10-PCS | Mod: S$GLB,,, | Performed by: NEUROLOGICAL SURGERY

## 2021-02-01 PROCEDURE — 1125F AMNT PAIN NOTED PAIN PRSNT: CPT | Mod: S$GLB,,, | Performed by: NEUROLOGICAL SURGERY

## 2021-02-01 PROCEDURE — 72100 X-RAY EXAM L-S SPINE 2/3 VWS: CPT | Mod: TC,PN

## 2021-02-01 PROCEDURE — 99212 OFFICE O/P EST SF 10 MIN: CPT | Mod: S$GLB,,, | Performed by: NEUROLOGICAL SURGERY

## (undated) DEVICE — KIT SPINAL PATIENT CARE JACK

## (undated) DEVICE — BURR MIS CURVED 3.0MM

## (undated) DEVICE — DRESSING AQUACEL FOAM 4X4IN

## (undated) DEVICE — GAUZE SPONGE 4X4 12PLY

## (undated) DEVICE — SEE MEDLINE ITEM 157150

## (undated) DEVICE — SUT CTD VICRYL 3-0 CR/SH

## (undated) DEVICE — CATH POLLACK OPEN-END FLEXI-TI

## (undated) DEVICE — SET IRR URLGY 2LINE UNIV SPIKE

## (undated) DEVICE — SEE MEDLINE ITEM 157185

## (undated) DEVICE — DRESSING TELFA N ADH 3X8

## (undated) DEVICE — SEE MEDLINE ITEM 157148

## (undated) DEVICE — GLOVE SURGICAL LATEX SZ 7

## (undated) DEVICE — SEE MEDLINE ITEM 152622

## (undated) DEVICE — JELLY LUBRICANT STERILE 4 OZ

## (undated) DEVICE — SUT 0 VICRYL / UR6 (J603)

## (undated) DEVICE — GLOVE BIOGEL PIMICRO INDIC 7.5

## (undated) DEVICE — GLOVE SURGICAL LATEX SZ 6.5

## (undated) DEVICE — SOL IRR NACL .9% 3000ML

## (undated) DEVICE — BAG LINGEMAN DRAIN UROLOGY

## (undated) DEVICE — DRESSING SURGICAL 1/2X1/2

## (undated) DEVICE — COVER OVERHEAD SURG LT BLUE

## (undated) DEVICE — WIRE K BLUNT TIP 1.5X500MM
Type: IMPLANTABLE DEVICE | Site: BACK | Status: NON-FUNCTIONAL
Removed: 2020-07-16

## (undated) DEVICE — NDL JAMSHIDI 10GA

## (undated) DEVICE — ELECTRODE REM PLYHSV RETURN 9

## (undated) DEVICE — DRAPE C-ARM/MOBILE XRAY 44X80

## (undated) DEVICE — SEE MEDLINE ITEM 154981

## (undated) DEVICE — SUT VICRYL 2 0 CT 2

## (undated) DEVICE — DRAPE STERI-DRAPE 1000 17X11IN

## (undated) DEVICE — SEE MEDLINE ITEM 156905

## (undated) DEVICE — COVER BACK TABLE 72X21

## (undated) DEVICE — ADHESIVE MASTISOL VIAL 48/BX

## (undated) DEVICE — GUIDE WIRE MOTION .035 X 150CM

## (undated) DEVICE — SEE MEDLINE ITEM 157117

## (undated) DEVICE — CORD BIPOLAR 12 FOOT

## (undated) DEVICE — SUT VICRYL PLUS 0 CT1 18IN

## (undated) DEVICE — DRESSING AQUACEL FOAM 5 X 5

## (undated) DEVICE — BLADE ELECTRO EDGE INSULATED

## (undated) DEVICE — SYS ILLUMINATION MARS3V SPINE

## (undated) DEVICE — TOWEL OR NONABSORB ADH 17X26

## (undated) DEVICE — SPONGE GAUZE 16PLY 4X4

## (undated) DEVICE — SEE MEDLINE ITEM 146313

## (undated) DEVICE — SEE MEDLINE ITEM 157131

## (undated) DEVICE — SYR ONLY LUER LOCK 20CC

## (undated) DEVICE — DRAPE INCISE IOBAN 2 23X23IN

## (undated) DEVICE — NDL 22GA X1 1/2 REG BEVEL

## (undated) DEVICE — DRESSING TRANS 4X4 TEGADERM

## (undated) DEVICE — DRESSING LEUKOPLAST FLEX 1X3IN

## (undated) DEVICE — DRAPE STERI INSTRUMENT 1018